# Patient Record
Sex: MALE | Race: WHITE | Employment: FULL TIME | URBAN - METROPOLITAN AREA
[De-identification: names, ages, dates, MRNs, and addresses within clinical notes are randomized per-mention and may not be internally consistent; named-entity substitution may affect disease eponyms.]

---

## 2018-03-05 ENCOUNTER — ED HISTORICAL/CONVERTED ENCOUNTER (OUTPATIENT)
Dept: OTHER | Age: 43
End: 2018-03-05

## 2020-08-28 ENCOUNTER — HOSPITAL ENCOUNTER (EMERGENCY)
Age: 45
Discharge: HOME | End: 2020-08-28
Attending: EMERGENCY MEDICINE
Payer: COMMERCIAL

## 2020-08-28 ENCOUNTER — APPOINTMENT (OUTPATIENT)
Dept: VASCULAR SURGERY | Age: 45
End: 2020-08-28
Attending: NURSE PRACTITIONER
Payer: COMMERCIAL

## 2020-08-28 VITALS
HEIGHT: 72 IN | TEMPERATURE: 98.5 F | HEART RATE: 78 BPM | WEIGHT: 219.8 LBS | OXYGEN SATURATION: 98 % | DIASTOLIC BLOOD PRESSURE: 93 MMHG | BODY MASS INDEX: 29.77 KG/M2 | RESPIRATION RATE: 16 BRPM | SYSTOLIC BLOOD PRESSURE: 142 MMHG

## 2020-08-28 DIAGNOSIS — I82.431 ACUTE DEEP VEIN THROMBOSIS (DVT) OF POPLITEAL VEIN OF RIGHT LOWER EXTREMITY (HCC): Primary | ICD-10-CM

## 2020-08-28 LAB
ANION GAP SERPL CALC-SCNC: 7 MMOL/L (ref 5–15)
BUN SERPL-MCNC: 10 MG/DL (ref 6–20)
BUN/CREAT SERPL: 10 (ref 12–20)
CALCIUM SERPL-MCNC: 8.3 MG/DL (ref 8.5–10.1)
CHLORIDE SERPL-SCNC: 101 MMOL/L (ref 97–108)
CO2 SERPL-SCNC: 26 MMOL/L (ref 21–32)
CREAT SERPL-MCNC: 1.01 MG/DL (ref 0.7–1.3)
GLUCOSE SERPL-MCNC: 106 MG/DL (ref 65–100)
POTASSIUM SERPL-SCNC: 3.6 MMOL/L (ref 3.5–5.1)
SODIUM SERPL-SCNC: 134 MMOL/L (ref 136–145)

## 2020-08-28 PROCEDURE — 74011250637 HC RX REV CODE- 250/637: Performed by: NURSE PRACTITIONER

## 2020-08-28 PROCEDURE — 99282 EMERGENCY DEPT VISIT SF MDM: CPT

## 2020-08-28 PROCEDURE — 93971 EXTREMITY STUDY: CPT

## 2020-08-28 PROCEDURE — 36415 COLL VENOUS BLD VENIPUNCTURE: CPT

## 2020-08-28 PROCEDURE — 80048 BASIC METABOLIC PNL TOTAL CA: CPT

## 2020-08-28 RX ORDER — KETOROLAC TROMETHAMINE 30 MG/ML
30 INJECTION, SOLUTION INTRAMUSCULAR; INTRAVENOUS
Status: DISCONTINUED | OUTPATIENT
Start: 2020-08-28 | End: 2020-08-28

## 2020-08-28 RX ORDER — ACETAMINOPHEN 500 MG
1000 TABLET ORAL
Status: COMPLETED | OUTPATIENT
Start: 2020-08-28 | End: 2020-08-28

## 2020-08-28 RX ORDER — IBUPROFEN 800 MG/1
800 TABLET ORAL
Status: COMPLETED | OUTPATIENT
Start: 2020-08-28 | End: 2020-08-28

## 2020-08-28 RX ORDER — HYDROCODONE BITARTRATE AND ACETAMINOPHEN 5; 325 MG/1; MG/1
1 TABLET ORAL
Qty: 12 TAB | Refills: 0 | Status: SHIPPED | OUTPATIENT
Start: 2020-08-28 | End: 2020-08-31

## 2020-08-28 RX ADMIN — ACETAMINOPHEN 1000 MG: 500 TABLET ORAL at 14:22

## 2020-08-28 RX ADMIN — IBUPROFEN 800 MG: 800 TABLET, FILM COATED ORAL at 14:22

## 2020-08-28 NOTE — ED TRIAGE NOTES
"Per CT read "There are multifocal subsegmental densities within the posterior right upper lobe, lingula, and left lower lobe not definitely seen on prior exam;" additionally, multiple old nodules and micronodules are stable on CT. Pt is a never-smoker, procalcitonin is negative, but does have new wheezing.    Plan  - Abx coverage with levofloxacin. Pt has been afebrile, without leukocytosis, making infectious etiology less likely.  - Single positive Blood culture growing G+ cocci, likely a contaminant. Will repeat Bcx  - Duonebs PRN  - Resp Viral Panel negative  - 40mg Prednisone started for possible reactive airway disease  " Pt arrives with c/o right calf pain x 1 week that has worsened. Also reports right calf swelling.

## 2020-08-28 NOTE — ED PROVIDER NOTES
27-year-old male with past medical history of high blood pressure, and everyday tobacco use. Resents ambulatory to the [facility] emergency department with complaints of right leg pain increasingly worse over the last week, nontraumatic pain. Patient states that the pain started with a small twinge to the right calf, pain has increased with swelling, and it has now affected the gait. She denies history of previous blood clot, however endorses 2-hour commute to and from work, and sitting at work for long extended periods of time. Patient with no other complaints, denies fever, chills, chest pain, shortness of breath, feeling dizzy or lightheaded. No past medical history on file. No past surgical history on file. No family history on file.     Social History     Socioeconomic History    Marital status:      Spouse name: Not on file    Number of children: Not on file    Years of education: Not on file    Highest education level: Not on file   Occupational History    Not on file   Social Needs    Financial resource strain: Not on file    Food insecurity     Worry: Not on file     Inability: Not on file    Transportation needs     Medical: Not on file     Non-medical: Not on file   Tobacco Use    Smoking status: Not on file   Substance and Sexual Activity    Alcohol use: Not on file    Drug use: Not on file    Sexual activity: Not on file   Lifestyle    Physical activity     Days per week: Not on file     Minutes per session: Not on file    Stress: Not on file   Relationships    Social connections     Talks on phone: Not on file     Gets together: Not on file     Attends Judaism service: Not on file     Active member of club or organization: Not on file     Attends meetings of clubs or organizations: Not on file     Relationship status: Not on file    Intimate partner violence     Fear of current or ex partner: Not on file     Emotionally abused: Not on file     Physically abused: Not on file     Forced sexual activity: Not on file   Other Topics Concern    Not on file   Social History Narrative    Not on file         ALLERGIES: Amoxapine    Review of Systems   Constitutional: Negative for chills and fever. Respiratory: Negative for shortness of breath. Cardiovascular: Negative for chest pain. Gastrointestinal: Negative for abdominal pain, nausea and vomiting. Musculoskeletal: Positive for arthralgias, gait problem and myalgias. Pain to right lower leg, nontraumatic and swollen. Skin: Negative for color change and wound. Neurological: Negative for dizziness and light-headedness. Vitals:    08/28/20 1239   BP: (!) 142/93   Pulse: 78   Resp: 16   Temp: 98.5 °F (36.9 °C)   SpO2: 98%   Weight: 99.7 kg (219 lb 12.8 oz)   Height: 6' (1.829 m)            Physical Exam  Vitals signs and nursing note reviewed. Constitutional:       Appearance: Normal appearance. HENT:      Head: Normocephalic. Nose: Nose normal.   Neck:      Musculoskeletal: Normal range of motion. Cardiovascular:      Rate and Rhythm: Normal rate. Pulses: Normal pulses. Dorsalis pedis pulses are 2+ on the right side. Comments: Nonpitting edema to the RLE. Pulmonary:      Effort: Pulmonary effort is normal. No respiratory distress. Breath sounds: No wheezing, rhonchi or rales. Abdominal:      General: There is no distension. Musculoskeletal: Normal range of motion. Right lower leg: He exhibits tenderness and swelling. He exhibits no bony tenderness, no deformity and no laceration. Edema present. Left lower leg: No edema. Legs:    Skin:     General: Skin is dry. Capillary Refill: Capillary refill takes less than 2 seconds. Neurological:      Mental Status: He is alert and oriented to person, place, and time.    Psychiatric:         Mood and Affect: Mood normal.          MDM  Number of Diagnoses or Management Options  Acute deep vein thrombosis (DVT) of popliteal vein of right lower extremity [facility]):   Diagnosis management comments: Possible: DVT  Plan: Duplex study to RLE, analgesia       Amount and/or Complexity of Data Reviewed  Clinical lab tests: ordered and reviewed  Tests in the radiology section of CPT®: ordered and reviewed  Discuss the patient with other providers: yes      VITAL SIGNS:  Patient Vitals for the past 4 hrs:   Temp Pulse Resp BP SpO2   08/28/20 1239 98.5 °F (36.9 °C) 78 16 (!) 142/93 98 %         LABS:  Recent Results (from the past 6 hour(s))   METABOLIC PANEL, BASIC    Collection Time: 08/28/20  2:11 PM   Result Value Ref Range    Sodium 134 (L) 136 - 145 mmol/L    Potassium 3.6 3.5 - 5.1 mmol/L    Chloride 101 97 - 108 mmol/L    CO2 26 21 - 32 mmol/L    Anion gap 7 5 - 15 mmol/L    Glucose 106 (H) 65 - 100 mg/dL    BUN 10 6 - 20 MG/DL    Creatinine 1.01 0.70 - 1.30 MG/DL    BUN/Creatinine ratio 10 (L) 12 - 20      GFR est AA >60 >60 ml/min/1.73m2    GFR est non-AA >60 >60 ml/min/1.73m2    Calcium 8.3 (L) 8.5 - 10.1 MG/DL        IMAGING:  No orders to display         Medications During Visit:  Medications   acetaminophen (TYLENOL) tablet 1,000 mg (1,000 mg Oral Given 8/28/20 1422)   ibuprofen (MOTRIN) tablet 800 mg (800 mg Oral Given 8/28/20 1422)         DECISION MAKING:  Rhiannon Ruiz is a 40 y.o. male who comes in as above. High risk factor for possible DVT, sedentary lifestyle, long car trips at work, everyday tobacco user. Reevaluation: Patient states that the ibuprofen and Tylenol helped mildly but the pain, ultrasound results remarkable for right popliteal vein DVT and right posterior tibial vein DVT. Discussed plan with patient to take apixaban 10 mg twice daily for 7 days and then 5 mg BID for 23 days, and Rx for hydrocodone. Tobacco cessation discussed with patient and also the need to call PCP immediately for follow-up appointment this coming week.   Patient verbalizes understanding agrees with plan patient is hemodynamically stable, renal function checked and found to be within normal limits. IMPRESSION:  1. Acute deep vein thrombosis (DVT) of popliteal vein of right lower extremity (HCC)        DISPOSITION:  Discharged      Current Discharge Medication List      START taking these medications    Details   HYDROcodone-acetaminophen (Norco) 5-325 mg per tablet Take 1 Tab by mouth every four (4) hours as needed for Pain for up to 3 days. Max Daily Amount: 6 Tabs. Qty: 12 Tab, Refills: 0    Associated Diagnoses: Acute deep vein thrombosis (DVT) of popliteal vein of right lower extremity (HCC)      apixaban (ELIQUIS) 5 mg tablet Take 2 Tabs by mouth two (2) times a day for 7 days. Qty: 28 Tab, Refills: 0              Follow-up Information     Follow up With Specialties Details Why Contact Info    Vijaya Reid NP Nurse Practitioner Schedule an appointment as soon as possible for a visit in 2 days Follow-Up in regard to new diagnosis DVT, [address] [phone]      [facility] EMERGENCY DEPT Emergency Medicine  If symptoms worsen [address] [facility]  [phone]            The patient is asked to follow-up with their primary care provider in the next several days. They are to call tomorrow for an appointment. The patient is asked to return promptly for any increased concerns or worsening of symptoms. They can return to this emergency department or any other emergency department. Procedures  Discussed patient care with Dana Oviedo MD. Attending was given the opportunity to see and evaluate the patient. Agrees with care plan as discussed. Elvia Moralez NP  1:54 PM    I was personally available for consultation in the emergency department. I have reviewed the chart and agree with the documentation recorded by the [facility] AND [facility], including the assessment, treatment plan, and disposition.   Felton Whitney MD

## 2020-08-28 NOTE — LETTER
[facility] EMERGENCY DEPT 
[address] [place] [id number] [phone] Work/School Note Date: 8/28/2020 To Whom It May concern: 
 
Oniel Olivares was seen and treated today in the emergency room by the following provider(s): 
Attending Provider: Ludmila Man MD 
Nurse Practitioner: León Retana, Emani Self NP. Oniel Olivares may return to work on 9/3/2020 . Sincerely, Sary Syed NP

## 2020-08-28 NOTE — DISCHARGE INSTRUCTIONS
VERY IMPORTANT FOR YOU TO CALL YOUR PCP ON Monday, SO THAT YOU MAY GET AN ADDITIONAL PRESCRIPTION FOR APIXABAN. TAKE THE COUPON FOR THE APIXABAN TO THE PHARMACY WITH YOU. DO NOT TAKE ANY IBUPROFEN OR ANY OTHER TYPE OF NSAID WHILE ON APIXABAN. IF YOU DEVELOP ANY VISIBLE BLEEDING RETURN TO THE ED IMMEDIATLEY.

## 2021-11-11 ENCOUNTER — OFFICE VISIT (OUTPATIENT)
Dept: ONCOLOGY | Age: 46
End: 2021-11-11
Payer: COMMERCIAL

## 2021-11-11 VITALS
HEART RATE: 64 BPM | SYSTOLIC BLOOD PRESSURE: 160 MMHG | RESPIRATION RATE: 18 BRPM | WEIGHT: 214 LBS | OXYGEN SATURATION: 97 % | HEIGHT: 72 IN | TEMPERATURE: 97 F | BODY MASS INDEX: 28.99 KG/M2 | DIASTOLIC BLOOD PRESSURE: 99 MMHG

## 2021-11-11 DIAGNOSIS — Z86.718 HISTORY OF DVT (DEEP VEIN THROMBOSIS): ICD-10-CM

## 2021-11-11 DIAGNOSIS — D58.2 ELEVATED HEMOGLOBIN (HCC): Primary | ICD-10-CM

## 2021-11-11 DIAGNOSIS — F17.213 CIGARETTE NICOTINE DEPENDENCE WITH WITHDRAWAL: ICD-10-CM

## 2021-11-11 PROCEDURE — 99244 OFF/OP CNSLTJ NEW/EST MOD 40: CPT | Performed by: INTERNAL MEDICINE

## 2021-11-11 NOTE — PROGRESS NOTES
Candy Chopra is a 55 y.o. male    Chief Complaint   Patient presents with   [address] [facility] Patient       Visit Vitals  BP (!) 160/99   Pulse 64   Temp 97 °F (36.1 °C) (Temporal)   Resp 18   Ht 6' (1.829 m)   Wt 214 lb (97.1 kg)   SpO2 97%   BMI 29.02 kg/m²       3 most recent PHQ Screens 11/11/2021   Little interest or pleasure in doing things Not at all   Feeling down, depressed, irritable, or hopeless Not at all   Total Score PHQ 2 0       No flowsheet data found. No flowsheet data found. 1. Have you been to the ER, urgent care clinic since your last visit? Hospitalized since your last visit? No     2. Have you seen or consulted any other health care providers outside of the [address] [place] [address] since your last visit? Include any pap smears or colon screening.  No

## 2021-11-11 NOTE — PROGRESS NOTES
[facility] [place] at [address], [address] [facility]: [phone]  F: [phone] Patient ID  Name: Selina Samson  YOB: 1975  MRN: [id number]  Referring Provider:   No referring provider defined for this encounter. Primary Care Provider:   Bhavana Holly NP       HEMATOLOGY/MEDICAL ONCOLOGY  NOTE   Date of Visit: 11/11/21  Reason for Evaluation:     Chief Complaint   Patient presents with    New Patient     Subjective:     History of Present Illness:     Selina Samson is a 55 y.o. M who presents for an initial evaluation for polycythemia. Patient reports dealing with recovery from surgery on October 20,2021. He reportedly had a left knee injury. He reports some improvement. .   Patient reports adequate oral intake of food and hydration. Patient denies any bowel or bladder problems. Patient denies any pain. Patient denies any shortness of breath. He reports that he was referred to see us for an elevated hemoglobin. He states that he had an elevated white blood cell count in the past and was seen at [facility]. He denies any testosterone use. He denies any unusual headaches. He notes that on occasion (about once a month) has had some visual changes (I.e. staring at the sun then looking awy from it). He did receive the Moderna Vaccine.  He works at [employer].      Past Medical History:   Diagnosis Date    Hypertension       Past Surgical History:   Procedure Laterality Date    HX CHOLECYSTECTOMY  2012    HX HERNIA REPAIR      HX ORTHOPAEDIC Left 10/20/2021    knee surgery    HX SHOULDER ARTHROSCOPY Left 2005      Social History     Tobacco Use    Smoking status: Current Every Day Smoker     Packs/day: 0.50    Smokeless tobacco: Never Used   Substance Use Topics    Alcohol use: Yes     Comment: rarely      Family History   Problem Relation Age of Onset    Leukemia Mother     Heart Disease Father      No current outpatient medications on file. Medications:  Prescription:  Klonapin, xanax, trazadone zyrtec. Atenolol. No current facility-administered medications for this visit. Allergies   Allergen Reactions    Amoxapine Rash      Review of Systems provided by:patient  General: denies fever and denies fatigue  Eyes: denies any acute vision loss and denies any eye pain  HEENT: denies epistaxis and denies trouble swallowing  Cardio: denies any chest pain and reports some mild leg swelling  Resp: denies any shortness of breath and denies any hemoptysis  Abdomen: denies any abdominal pain, denies any nausea, denies any vomiting and denies any diarrhea Patient denies any blood loss. , Patient denies any hematochezia. and Patient denies any melena. MSK: reports arthralgias  Skin: denies any rash and denies any itching  Lymph: denies any lymph node enlargement and denies any lymph node tenderness  Neuro: denies any headache and denies any tremor  Psych: denies depression and reports anxiety      Objective:     Visit Vitals  BP (!) 160/99   Pulse 64   Temp 97 °F (36.1 °C) (Temporal)   Resp 18   Ht 6' (1.829 m)   Wt 214 lb (97.1 kg)   SpO2 97%   BMI 29.02 kg/m²     ECOG PS: 0- Fully active, able to carry on all pre-disease performance without restriction. Physical Exam  Constitutional: No acute distress. , Non-toxic appearance. and Non-diaphoretic. HENT: Normocephalic and atraumatic head. Eyes: Normal Conjunctivae. and Anicteric sclerae. Cardiovascular: Normal heart sounds., No pitting edema., No friction rub. and No gallops auscultated. Pulmonary: Normal Respiratory Effort., No wheezing., No rhonchi. and No rales. Abdominal: Normal bowel sounds. , Soft Abdomen to palpation. , No abdominal tenderness., No guarding. and No rebound tenderness. Skin: No jaundice. and No rash. Musculoskeletal: No muscle pain on palpation. No temporal muscle wasting on inspection.   Neurological: Alert and oriented to person, place, and time. Mental status is at baseline. and No tremor on inspection. Psychiatric: mood normal. normal speech rate and normal affect        Results:     I personally reviewed Epic EHR labs/results below:     Lab Results   Component Value Date/Time    Sodium 134 (L) 08/28/2020 02:11 PM    Potassium 3.6 08/28/2020 02:11 PM    Chloride 101 08/28/2020 02:11 PM    CO2 26 08/28/2020 02:11 PM    Glucose 106 (H) 08/28/2020 02:11 PM    BUN 10 08/28/2020 02:11 PM    Creatinine 1.01 08/28/2020 02:11 PM    GFR est AA >60 08/28/2020 02:11 PM    GFR est non-AA >60 08/28/2020 02:11 PM    Calcium 8.3 (L) 08/28/2020 02:11 PM     No results found for: TBILI, ALT, AP, TP, ALB, GLOB    I personally reviewed pertinent referral notes:   Hgb was 19.0 High on 9/29/21  WBC 9.2    Abs Neutrophils 6.3    Iron Sat 60% Iron 206 elevated. Assessment and Recommendations:   Diagnoses and all orders for this visit:    1. Elevated hemoglobin (HCC)  -     CBC WITH AUTOMATED DIFF; Future  -     PERIPHERAL SMEAR; Future  -     JAK2 V617F, RFX CALR/E12-15/MPL  -     ERYTHROPOIETIN; Future  -     IRON PROFILE; Future  -     FERRITIN; Future  -     XR CHEST PA LAT; Future  -     US ABD COMP; Future  Patient with a history of elevated hemoglobin. We discussed that I would recommend the above work-up. We discussed that I cannot exclude a primary polycythemia vera. We discussed I cannot exclude any hemochromatosis I will repeat his iron studies. May need to consider hemochromatosis mutation testing but will defer until after his myeloproliferative neoplasm testing has been completed. He did have an iron saturation at 60% in September. Patient currently without symptomatic complaints. No plans for phlebotomy at this time. Patient seemingly only agreeable to blood work although I did discuss with him that I cannot exclude an occult liver or renal cancer as a contributor or underlying pulmonary disease.     2. History of DVT (deep vein thrombosis)  Patient with a history of a DVT diagnosed in August 2020. This was acute thrombus present in the right popliteal vein. He states that he completed anticoagulation and is off therapy. Patient reports remaining active. 3. Cigarette nicotine dependence with withdrawal  We discussed that surely smoking can contribute to polycythemia. However, we discussed the importance distillates include and evaluate other causes. Follow-up and Dispositions    · Return in 3 weeks (on 12/2/2021).          Signed By:   Vicenta Jiménez MD

## 2021-11-12 PROBLEM — D58.2 ELEVATED HEMOGLOBIN (HCC): Status: ACTIVE | Noted: 2021-11-12

## 2021-11-23 ENCOUNTER — OFFICE VISIT (OUTPATIENT)
Dept: ORTHOPEDIC SURGERY | Age: 46
End: 2021-11-23
Payer: COMMERCIAL

## 2021-11-23 DIAGNOSIS — M25.562 POSTOPERATIVE PAIN OF LEFT KNEE: Primary | ICD-10-CM

## 2021-11-23 DIAGNOSIS — G89.18 POSTOPERATIVE PAIN OF LEFT KNEE: Primary | ICD-10-CM

## 2021-11-23 PROCEDURE — 99024 POSTOP FOLLOW-UP VISIT: CPT | Performed by: ORTHOPAEDIC SURGERY

## 2021-11-23 RX ORDER — HYDROCODONE BITARTRATE AND ACETAMINOPHEN 10; 325 MG/1; MG/1
1 TABLET ORAL
Qty: 40 TABLET | Refills: 0 | Status: SHIPPED | OUTPATIENT
Start: 2021-11-23 | End: 2021-11-30

## 2021-11-23 NOTE — PROGRESS NOTES
Ramez Mireles (: 1975) is a 55 y.o. male, patient, here for evaluation of the following chief complaint(s):  Knee Pain (Left)       HPI:    He is now 4 weeks status post left quad tendon repair. He states that he has made progress in terms of his ability to ambulate without crutches but does continue to have some pain at night. He continues to use 1 hydrocodone per night to help him sleep. He states overall that he is making good progress and his pain continues to decrease. Allergies   Allergen Reactions    Amoxapine Rash       No current outpatient medications on file. No current facility-administered medications for this visit.        Past Medical History:   Diagnosis Date    Hypertension         Past Surgical History:   Procedure Laterality Date    HX CHOLECYSTECTOMY  2012    HX HERNIA REPAIR      HX ORTHOPAEDIC Left 10/20/2021    knee surgery    HX SHOULDER ARTHROSCOPY Left        Family History   Problem Relation Age of Onset    Leukemia Mother     Heart Disease Father         Social History     Socioeconomic History    Marital status:      Spouse name: Not on file    Number of children: Not on file    Years of education: Not on file    Highest education level: Not on file   Occupational History    Not on file   Tobacco Use    Smoking status: Current Every Day Smoker     Packs/day: 0.50    Smokeless tobacco: Never Used   Substance and Sexual Activity    Alcohol use: Yes     Comment: rarely    Drug use: Yes     Types: Marijuana     Comment: once per month    Sexual activity: Not on file   Other Topics Concern    Not on file   Social History Narrative    Not on file     Social Determinants of Health     Financial Resource Strain:     Difficulty of Paying Living Expenses: Not on file   Food Insecurity:     Worried About Running Out of Food in the Last Year: Not on file    Darlin of Food in the Last Year: Not on file   Transportation Needs:     Lack of Transportation (Medical): Not on file    Lack of Transportation (Non-Medical): Not on file   Physical Activity:     Days of Exercise per Week: Not on file    Minutes of Exercise per Session: Not on file   Stress:     Feeling of Stress : Not on file   Social Connections:     Frequency of Communication with Friends and Family: Not on file    Frequency of Social Gatherings with Friends and Family: Not on file    Attends Restorationist Services: Not on file    Active Member of [address] [place] [address] or Organizations: Not on file    Attends Club or Organization Meetings: Not on file    Marital Status: Not on file   Intimate Partner Violence:     Fear of Current or Ex-Partner: Not on file    Emotionally Abused: Not on file    Physically Abused: Not on file    Sexually Abused: Not on file   Housing Stability:     Unable to Pay for Housing in the Last Year: Not on file    Number of [place] in the Last Year: Not on file    Unstable Housing in the Last Year: Not on file       Review of Systems    Vitals: There were no vitals taken for this visit. There is no height or weight on file to calculate BMI. Ortho Exam     The wounds are clean and dry neuro vas intact. There is no significant effusion. His incisions of healed nicely. His passive heel slide range of motion is up to approximately 40 degrees. His sensations intact his pulses are 2+. ASSESSMENT/PLAN:      1. Postoperative pain of left knee  The following orders have not been finalized:  -     HYDROcodone-acetaminophen (Norco)  mg tablet       Below is the assessment and plan developed based on review of pertinent history, physical exam, labs, studies, and medications. Talked at length about his progress and we will have him begin twice a day gentle heel slides. He will continue to use his knee brace locked in full extension. I will see him back in 2 weeks for reevaluation unlock brace to 50 to 60 degrees.   Will do that for 2 weeks and then unlock him to 90 degrees. We discussed the risk and benefits of this treatment plan and he would like to proceed. No follow-ups on file. An electronic signature was used to authenticate this note.   -- Nathaniel Sawyer

## 2021-11-23 NOTE — LETTER
11/23/2021 12:15 PM    Mr. Wilson Armstrong  [address]  [facility] [address]      Unable to return to work until seen again in two weeks         Sincerely,      Delio Macedo MD

## 2021-11-24 ENCOUNTER — TELEPHONE (OUTPATIENT)
Dept: ONCOLOGY | Age: 46
End: 2021-11-24

## 2021-11-24 LAB
BACKGROUND, 081113: NORMAL
BACKGROUND, 489163: NORMAL
BACKGROUND: 480503: NORMAL
CALR MUTATION DETECTION RESULT, 489451: NORMAL
COMMENT, 489419: NORMAL
EXTRACTION: NORMAL
EXTRACTION: NORMAL
JAK2 GENE MUT ANL BLD/T: NORMAL
JAK2 P.V617F BLD/T QL: NORMAL
LAB DIRECTOR NAME PROVIDER: NORMAL
MPL GENE MUT TESTED BLD/T: NORMAL
MPL P.W515L+W515K+S505N BLD/T QL: NORMAL
REF LAB TEST METHOD: NORMAL
REF LAB TEST METHOD: NORMAL
REFERENCES, 150293: NORMAL
REFERENCES, 150293: NORMAL
REFERENCES: NORMAL
SERVICE CMNT-IMP: NORMAL

## 2021-11-24 NOTE — TELEPHONE ENCOUNTER
[address] at [facility]  [phone]    11/24/21 12:14 PM - Attempted to call patient regarding outstanding ultrasound and x-ray. There was no answer. Left a voicemail for patient to call back at their earliest convenience along with the phone number to our office. Date Of Previous Surgery (Optional): 01.15.2020

## 2021-11-24 NOTE — TELEPHONE ENCOUNTER
Patient called and stated he was returning a call and Ramiro Muse advise me she was calling him to give him the scheduling departments number for an outstanding ultrasound. Patient stated he would like to discuss this at his next visit on 12/2 to see if this still necessary because he did not feel like doing it.  Please advise           ANNE# [phone]

## 2021-12-02 ENCOUNTER — OFFICE VISIT (OUTPATIENT)
Dept: ONCOLOGY | Age: 46
End: 2021-12-02
Payer: COMMERCIAL

## 2021-12-02 VITALS
DIASTOLIC BLOOD PRESSURE: 102 MMHG | RESPIRATION RATE: 16 BRPM | BODY MASS INDEX: 29.28 KG/M2 | WEIGHT: 216.2 LBS | SYSTOLIC BLOOD PRESSURE: 184 MMHG | OXYGEN SATURATION: 98 % | TEMPERATURE: 97 F | HEIGHT: 72 IN | HEART RATE: 66 BPM

## 2021-12-02 DIAGNOSIS — D58.2 ELEVATED HEMOGLOBIN (HCC): Primary | ICD-10-CM

## 2021-12-02 DIAGNOSIS — F17.213 CIGARETTE NICOTINE DEPENDENCE WITH WITHDRAWAL: ICD-10-CM

## 2021-12-02 PROCEDURE — 99213 OFFICE O/P EST LOW 20 MIN: CPT | Performed by: INTERNAL MEDICINE

## 2021-12-02 RX ORDER — ATENOLOL 50 MG/1
TABLET ORAL
COMMUNITY
Start: 2021-11-17

## 2021-12-02 RX ORDER — ALPRAZOLAM 1 MG/1
TABLET ORAL
COMMUNITY
Start: 2021-11-30

## 2021-12-02 RX ORDER — CLONAZEPAM 2 MG/1
TABLET ORAL
COMMUNITY
Start: 2021-11-30

## 2021-12-02 RX ORDER — DICLOFENAC SODIUM 10 MG/G
GEL TOPICAL
COMMUNITY
Start: 2021-07-26

## 2021-12-02 RX ORDER — TRAZODONE HYDROCHLORIDE 100 MG/1
TABLET ORAL
COMMUNITY
Start: 2021-11-29

## 2021-12-02 RX ORDER — PRAVASTATIN SODIUM 20 MG/1
20 TABLET ORAL DAILY
COMMUNITY
Start: 2021-10-05

## 2021-12-02 RX ORDER — OXYCODONE AND ACETAMINOPHEN 5; 325 MG/1; MG/1
TABLET ORAL
COMMUNITY
Start: 2021-10-19

## 2021-12-02 NOTE — LETTER
12/2/2021    Patient: Nelson Thompson   YOB: 1975   Date of Visit: 12/2/2021     Fly Mendez NP  [address]  [address] [facility] [address]  Via Fax: [phone]    Dear Fly Mendez NP,      Thank you for referring Mr. Rachell Lloyd to [facility] for evaluation. My notes for this consultation are attached. If you have questions, please do not hesitate to call me. I look forward to following your patient along with you.       Sincerely,    Corrine Varela MD

## 2021-12-02 NOTE — PROGRESS NOTES
[facility] at [address], [address] [facility] [address] [facility]  Nancy Maganaosh: [phone]  F: [phone] Patient ID  Name: Damian Holland  YOB: 1975  MRN: [id number]  Referring Provider:   No referring provider defined for this encounter. Primary Care Provider:   Chris Moncada NP       HEMATOLOGY/MEDICAL ONCOLOGY  NOTE   Date of Visit: 12/02/21  Reason for Evaluation:     Chief Complaint   Patient presents with    Follow-up     Vickie Benitez is a pleasant 55year old male who presents as a follow up. He reports left knee pain     Subjective:     History of Present Illness:     Damian Holland is a 55 y.o. M who presents for a follow-up evaluation for polycythemia. Patient presents today in follow-up. He had testing completed last month that was negative for any  mutation associated with polycythemia vera. His EPO level was slightly elevated. He tells me that he has reduced his smoking but it does vary. He notes that on a day like today he had about 4 cigarettes. He otherwise states that he feels well and is anticipating some further improvement in his recovery from knee surgery. He denies any headaches or acute vision changes. He denies any plethora. He denies any symptomatic complaints especially with bending over. He denies any shortness of breath.     Past Medical History:   Diagnosis Date    Hypertension       Past Surgical History:   Procedure Laterality Date    HX CHOLECYSTECTOMY  2012    HX HERNIA REPAIR      HX ORTHOPAEDIC Left 10/20/2021    knee surgery    HX SHOULDER ARTHROSCOPY Left 2005      Social History     Tobacco Use    Smoking status: Current Every Day Smoker     Packs/day: 0.50    Smokeless tobacco: Never Used   Substance Use Topics    Alcohol use: Yes     Comment: rarely      Family History   Problem Relation Age of Onset    Leukemia Mother     Heart Disease Father        Current Outpatient Medications   Medication Instructions    ALPRAZolam (XANAX) 1 mg tablet No dose, route, or frequency recorded.  atenoloL (TENORMIN) 50 mg tablet TAKE 1 AND 1/2 TABLETS BY MOUTH EVERY DAY    clonazePAM (KlonoPIN) 2 mg tablet No dose, route, or frequency recorded.  diclofenac (Voltaren) 1 % gel Apply  to affected area.  oxyCODONE-acetaminophen (PERCOCET) 5-325 mg per tablet TAKE 1 TABLET BY MOUTH EVERY 4 HOURS TO 6 HOURS AS NEEDED FOR PAIN    pravastatin (PRAVACHOL) 20 mg, DAILY    traZODone (DESYREL) 100 mg tablet TAKE 1 AND 1/2 TABLETS BY MOUTH AT BEDTIME       No current facility-administered medications for this visit. Allergies   Allergen Reactions    Amoxapine Rash      Review of Systems: A complete review of systems was obtained, reviewed. Pertinent findings reviewed above. Objective:     Visit Vitals  BP (!) 184/102   Pulse 66   Temp 97 °F (36.1 °C)   Resp 16   Ht 6' (1.829 m)   Wt 216 lb 3.2 oz (98.1 kg)   SpO2 98%   BMI 29.32 kg/m²     ECOG PS: 0- Fully active, able to carry on all pre-disease performance without restriction. Physical Exam  Constitutional: No acute distress. , Non-toxic appearance. and Non-diaphoretic. HENT: Normocephalic and atraumatic head. Eyes: Normal Conjunctivae. and Anicteric sclerae. Cardiovascular: Normal heart sounds., No pitting edema., No friction rub. and No gallops auscultated. Pulmonary: Normal Respiratory Effort., No wheezing., No rhonchi. and No rales. Abdominal: Normal bowel sounds. , Soft Abdomen to palpation. , No abdominal tenderness., No guarding. and No rebound tenderness. Skin: No jaundice., No rash., No petechiae. and no angeline complexion. Professional tattoo's. Musculoskeletal: No muscle pain on palpation. No temporal muscle wasting on inspection. Lymph: No cervical, supraclavicular,or axillary lymph node enlargement or tenderness. Neurological: Alert and oriented to person, place, and time. Mental status is at baseline. , No tremor on inspection.    and Normal Gait. Psychiatric: mood normal. normal speech rate and normal affect      Results:     I personally reviewed Epic EHR labs/results below:     Orders Only on 11/11/2021   Component Date Value Ref Range Status    Ferritin 11/11/2021 169  26 - 388 NG/ML Final    Iron 11/11/2021 85  35 - 150 ug/dL Final    TIBC 11/11/2021 408  250 - 450 ug/dL Final    Iron % saturation 11/11/2021 21  20 - 50 % Final    Erythropoietin 11/11/2021 24.2* 2.6 - 18.5 mIU/mL Final    Comment: (NOTE)  Boxxet DxI 800 Immunoassay System  Values obtained with different assay methods or kits cannot be used  interchangeably. Results cannot be interpreted as absolute evidence  of the presence or absence of malignant disease. Performed At: [address] [id number]  Duane Guerrier MD VW:[id number]     [address] [place] [address] 11/11/2021 Pathologic examination results can be viewed in [facility] Chart Review under the Pathology tab.     Final        WBC 11/11/2021 10.0  4.1 - 11.1 K/uL Final    RBC 11/11/2021 5.79* 4.10 - 5.70 M/uL Final    HGB 11/11/2021 20.9* 12.1 - 17.0 g/dL Final    RESULTS VERIFIED, PHONED TO AND READ BACK BY [email or web address]    HCT 11/11/2021 60.3* 36.6 - 50.3 % Final    MCV 11/11/2021 104.1* 80.0 - 99.0 FL Final    MCH 11/11/2021 36.1* 26.0 - 34.0 PG Final    MCHC 11/11/2021 34.7  30.0 - 36.5 g/dL Final    RDW 11/11/2021 12.7  11.5 - 14.5 % Final    PLATELET 07/54/5208 144  150 - 400 K/uL Final    MPV 11/11/2021 12.0  8.9 - 12.9 FL Final    NRBC 11/11/2021 0.0  0  WBC Final    ABSOLUTE NRBC 11/11/2021 0.00  0.00 - 0.01 K/uL Final    NEUTROPHILS 11/11/2021 51  32 - 75 % Final    BAND NEUTROPHILS 11/11/2021 1  0 - 6 % Final    LYMPHOCYTES 11/11/2021 45  12 - 49 % Final    MONOCYTES 11/11/2021 3* 5 - 13 % Final    EOSINOPHILS 11/11/2021 0  0 - 7 % Final    BASOPHILS 11/11/2021 0  0 - 1 % Final    IMMATURE GRANULOCYTES 11/11/2021 0 % Final    ABS. NEUTROPHILS 11/11/2021 5.2  1.8 - 8.0 K/UL Final    ABS. LYMPHOCYTES 11/11/2021 4.5* 0.8 - 3.5 K/UL Final    ABS. MONOCYTES 11/11/2021 0.3  0.0 - 1.0 K/UL Final    ABS. EOSINOPHILS 11/11/2021 0.0  0.0 - 0.4 K/UL Final    ABS. BASOPHILS 11/11/2021 0.0  0.0 - 0.1 K/UL Final    ABS. IMM. GRANS. 11/11/2021 0.0  K/UL Final    DF 11/11/2021 MANUAL    Final    RBC COMMENTS 11/11/2021     Final                    Value:MACROCYTOSIS  1+     Office Visit on 11/11/2021   Component Date Value Ref Range Status    JAK2 Mutation Analysis 11/11/2021 Comment   Final    Comment: Result: NEGATIVE for the JAK2 V617F mutation. Interpretation:  The G to T nucleotide change encoding the V617F  mutation was not detected. This result does not rule out the presence  of the JAK2 mutation at a level below the sensitivity of detection of  this assay, or the presence of other mutations within JAK2 not  detected by this assay. This result does not rule out a diagnosis of  polycythemia vera, essential thrombocythemia or idiopathic  myelofibrosis as the V617F mutation is not detected in all patients  with these disorders.  Background: 11/11/2021 Comment   Final    Comment: JAK2 is a cytoplasmic tyrosine kinase with a key role in signal  transduction from multiple hematopoietic growth factor receptors. A  point mutation within exon 14 of the JAK2 gene ([id number]) encoding a  valine to phenylalanine substitution at position 617 of the JAK2  protein (V617F) has been identified in most patients with polycythemia  vera, and in about half of those with either essential thrombocythemia  or idiopathic myelofibrosis. The V617F has also been detected,  although infrequently, in other myeloid disorders such as chronic  myelomonocytic leukemia and chronic neutrophilic luekemia.  V617F is  an acquired mutation that alters a highly conserved valine present in  the negative regulatory JH2 domain of the JAK2 protein and is  predicted to dysregulate kinase activity. Methodology: Total genomic DNA was extracted and subjected to TaqMan real-time PCR  amplification/detection. Two amplification products per sample were  monitored by real-time PCR using primers/probes specific                            to JAK2 wild  type (WT) and JAK2 mutant V617F. The [employer] Absolute Quantitation  software will compare the patient specimen valuse to the standard  curves and generate percent values for wild type and mutant type. In vitro studies have indicated that this assay has an analytical  sensitivity of 1%. References:  Alexandro NOLASCO, Roland Jewell, et al. Acquired mutation of the  tyrosine kinase JAK2 in human myeloproliferative disorders. Lancet. 2005 Mar 19-25; 365(6573):4926-6121. Ton Davenport JP. A unique clonal JAK2 mutation leading  to constitutive signaling causes polycythaemia vera. Nature. 2005 Apr 28; 711(0013):3327-8319. Margaux R, Kyle F, Jud AS, et al. A gain-of-function  mutation of JAK2 in myeloproliferative disorders. N Engl J Med. 2005  Apr 28; 352(50):9932-8895.  Director Review 11/11/2021 Comment   Final    Comment: Berta Sandoval, PhD, [facility].      Director, [address] [place] [address] for [address] [place] [address] [address] [place] [address]      [phone]  This test was developed and its performance characteristics  determined by [facility]ot Villegas. It has not been cleared or approved  by the Food and Drug Administration.  COMMENT 11/11/2021 Comment   Final    Comment: Reflex to CALR Mutation Analysis, JAK2 Exon 12-15 Mutation Analysis,  and MPL Mutation Analysis is indicated.  Extraction 11/11/2021 Completed   Final    CALR MUTATION DETECTION RESULT 11/11/2021 Comment   Final    Comment: NEGATIVE  No insertions or deletions were detected within the analyzed region  of the calreticulin (CALR) gene.   A negative result does not entirely exclude the possibility of a  clonal population carrying CALR gene mutations that are not covered  by this assay. Results should be interpreted in conjunction with  clinical and laboratory findings for the most accurate interpretation.  Background 11/11/2021 Comment   Final    Comment: The calcium-binding endoplasmic reticulin chaperone protein,  calreticulin (CALR), is somatically mutated in approximately 70% of  patients with JAK2-negative essential thrombocythemia (ET) and 60-88%  of patients with JAK2-negative primary myelofibrosis(PMF). Only a  minority of patients (approximately 8%) with myelodysplasia have  mutations in  CALR gene. CALR mutations are rarely detected in  patients with de mayito acute myeloid leukemia, chronic myelogenous  leukemia, lymphoid leukemia, or solid tumors. CALR mutations are not  detected in polycythemia and generally appear to be mutually exclusive  with JAK2 mutations and MPL mutations. The majority of mutational changes involve a variety of insertion or  deletion mutations in exon 9 of the calreticulin gene: approximately  53% of all CALR mutations are a 52 bp deletion (type-1) while the  second most prevalent mutation (approximately 32%) contains a 5 bp  insertion (type-2). Other mutations (non-type 1 or type 2) are seen  in a sma                           ll minority of cases. CALR mutations in PMF tend to be  associated with a favorable prognosis compared to JAK2 V617F  mutations, whereas primary myelofibrosis negative for CALR, JAK2  V617F and MPL mutations (so-called triple negative) is associated  with a poor prognosis and shorter survival.  The detection of a CALR gene mutation aids in the specific diagnosis  of a myeloproliferative neoplasm, and help distinguish this clonal  disease from a benign reactive process.  Methodology 11/11/2021 Comment   Final    Comment: Genomic DNA was isolated from the provided specimen.  Polymerase chain  reaction (PCR) of exon 9 of the CALR gene was performed with specific  fluorescent-labeled primers, and the PCR product was analyzed by  capillary gel electrophoresis to determine the size of the PCR  products. This PCR assay is capable of detecting a mutant cell  population with a sensitivity of 5 mutant cells per 100 normal cells. A negative result does not exclude the presence of a  myeloproliferative disorder or other neoplastic process. This test was developed and its performance characteristics determined  by [employer]. It has not been cleared or approved by the Food and Drug  Administration. The FDA has determined that such clearance or approval  is not necessary.  References 11/11/2021 Comment   Final    Comment: 1. Lizeth Howell et al. (2013) Somatic mutations of calreticulin in     myeloproliferative neoplasms. New Engl. J. Med. 124:0315-2230.  2. Lowell Cushing et al. (2013) Somatic CALR mutations in     myeloproliferative neoplasms with nonmutated JAK2. [facility] [place]. [facility] 537:4617-0516.  Director review 11/11/2021 Comment   Final    Comment: Otoniel Berrios, PhD, [facility] [place]                 Director, [address] [place] [address]                 [phone]      JAK2 Exons 12-15 Mut Det PCR 11/11/2021 Comment   Final    Comment: NEGATIVE  JAK2 mutations were not detected in exons 12, 13, 14 and 15. This  result does not rule out the presence of JAK2 mutation at a level  below the detection sensitivity of this assay, the presence of  other mutations outside the analyzed region of the JAK2 gene, or  the presence of a myeloproliferative or other neoplasm. Result must  be correlated with other clinical data for the most accurate  diagnosis.  Background 11/11/2021 Comment   Final    Comment: JAK2 V617F mutation is detected in patients with polycythemia vera  (95%), essential thrombocythemia (50%) and primary myelofibrosis  (50%).  A small percentage of JAK2 mutation positive patients (3.3%)  contain other non-V617F mutations within exons 12 to 15. The detection  of a JAK2 gene mutation aids in the specific diagnosis of a  myeloproliferative neoplasm, and help distinguish this clonal disease  from a benign reactive process.  Method 11/11/2021 Comment   Final    Comment: Total RNA was purified from the provided specimen. The JAK2 gene  region covering exons 12 to 15 was subjected to reverse-transcription  coupled PCR amplification, and bi-directional sequencing to identify  sequence variations. This assay has a sensitivity to detect  approximately 15% population of cells containing the JAK2 mutations  in a background of non-mutant cells. This test was developed and its performance characteristics determined  by [employer]. It has not been cleared or approved by the Food and Drug  Administration.  References 11/11/2021 Comment   Final    Comment: Zuleyma Lugo et al. Detection of mutations in JAK2 exons 12-15 by  Frandy sequencing. Int J Lab Hemato. 2015, 38:34-41. Michelle Luna al. Mutation profile of JAK2 transcripts in patients with  chronic myeloproliferative neoplasias. J Mol Diagn. 2009, 11:49-53.  Director review 11/11/2021 Comment   Final    Comment: Fred Moctezuma, PhD, [address] [facility] Associate , [address] [place] [address] for Molecular Biology / Pathology     Laboratory [facility] of [address] [place], [place] [address] [facility], [address] [place] [address], [address] [place] [address]     [phone]      MPL MUTATION ANALYSIS RESULT 11/11/2021 Comment   Final    Comment: No MPL mutation was identified in the provided specimen of this  individual. Results should be interpreted in conjunction with  clinical and other laboratory findings for the most accurate  interpretation.       Background: 11/11/2021 Comment   Final    Comment: MPL (myeloproliferative leukemia virus oncogene homology) belongs to  the hematopoietin superfamily and enables its ligand thrombopoietin  to facilitate both global hematopoiesis and megakaryocyte growth and  differentiation. MPL W515 mutations are present in patients with  primary myelofibrosis (PMF) and essential thrombocythemia (ET) at a  frequency of approximately 5% and 1% respectively. The S505 mutation  is detected in patients with hereditary thrombocythemia.  Methodology 11/11/2021 Comment   Final    Comment: Genomic DNA was purified from the provided specimen. MPL gene region  covering the S505N and W515L/K mutations were subjected to PCR  amplification and bi-directional sequencing in duplicate to identify  sequence variations. This assay has a sensitivity to detect  approximately 20-25% population of cells containing the MPL mutations  in a background of non-mutant cells. This assay will not detect the  mutation below the sensitivity of this assay. Molecular-based testing  is highly accurate, but as in any laboratory test, rare diagnostic  errors may occur.  References 11/11/2021 Comment   Final    Comment: 1. Miroslava ESPINOZA et al. (2006). GOU688 mutations in     myeloproliferative and other myeloid disorders: a study     of 1182 patients. Blood 254:1633-9018.  2. Franky Gama and Cherelle JENKINS. (2008). JAK2 and MPL     mutations in myeloproliferative neoplasms: discovery and     science. Leukemia 22:1736-3192.  3. Christy Latif et al. (2009). Evidence for a  effect     of the MPL-S505N mutation in eight [facility] pedigrees with     hereditary thrombocythemia. Haematologica 94(10):7068-     8943.       Director review 11/11/2021 Comment   Final    Comment: Marcia Escudero, PhD, [address] [facility] , [address] [place] [address] for Molecular Biology / Pathology     [address] [place] [address], [address] [place] [address]     [phone]  This test was developed and its performance characteristics  determined by [facility]. It has not been cleared or approved  by the Food and Drug Administration.  Extraction 11/11/2021 Comment   Final    This sample has been received and DNA extraction has been performed. Assessment and Recommendations:       Diagnoses and all orders for this visit:    1. Elevated hemoglobin (HCC)  -     CBC W/O DIFF; Future  Recheck his CBC; While likely secondary polycythemia due to either smoking +/- occult tumor, +/- undiagnosed sleep apnea. Since asymptomatic no plans for phlebotomy; reviewed alarm symptoms for patient to seek care. Not able to easily check a carboxyhemoglobin level. He understood my recommendation for him to undergo evaluation with abdominal ultrasound and chest xray since he could have an occult tumor and we don't want to miss this because resection or other therapies could be curative. 2. Cigarette nicotine dependence with withdrawal  Strongly recommend smoking cessation. He said that he has reduced his smoking. We discussed that if his polycythemia improves then that may support the cuase of smoking at least in part though we already suspect this explanation at least in part. Follow-up and Dispositions    · Return in about 8 weeks (around 1/27/2022). I spent a total of 25 minutes on this encounter with the more than 50% counseling patient to stop smoking, and reconsider going for imaging to evaluate for any occult EPO-secreting tumors, as well as consideration to be evaluated for sleep apnea in primary care since these factors collectively all can contribute to a polycythemia.     Signed By:   Brandy Esparza MD

## 2021-12-02 NOTE — PROGRESS NOTES
Chief Complaint   Patient presents with    Follow-up     Darling Sinha is a pleasant 55year old male who presents as a follow up.  He reports left knee pain

## 2021-12-02 NOTE — PATIENT INSTRUCTIONS
Orders Only on 11/11/2021   Component Date Value Ref Range Status    Ferritin 11/11/2021 169  26 - 388 NG/ML Final    Iron 11/11/2021 85  35 - 150 ug/dL Final    TIBC 11/11/2021 408  250 - 450 ug/dL Final    Iron % saturation 11/11/2021 21  20 - 50 % Final    Erythropoietin 11/11/2021 24.2* 2.6 - 18.5 mIU/mL Final    Comment: (NOTE)  "[employer] DxI 800 Immunoassay System  Values obtained with different assay methods or kits cannot be used  interchangeably. Results cannot be interpreted as absolute evidence  of the presence or absence of malignant disease. Performed At: [address] [id number]  Smiley Shepherd MD QC:[id number]     [address] [place] [address] 11/11/2021 Pathologic examination results can be viewed in [facility] Chart Review under the Pathology tab. Final        WBC 11/11/2021 10.0  4.1 - 11.1 K/uL Final    RBC 11/11/2021 5.79* 4.10 - 5.70 M/uL Final    HGB 11/11/2021 20.9* 12.1 - 17.0 g/dL Final    RESULTS VERIFIED, PHONED TO AND READ BACK BY [email or web address]    HCT 11/11/2021 60.3* 36.6 - 50.3 % Final    MCV 11/11/2021 104.1* 80.0 - 99.0 FL Final    MCH 11/11/2021 36.1* 26.0 - 34.0 PG Final    MCHC 11/11/2021 34.7  30.0 - 36.5 g/dL Final    RDW 11/11/2021 12.7  11.5 - 14.5 % Final    PLATELET 16/83/5317 355  150 - 400 K/uL Final    MPV 11/11/2021 12.0  8.9 - 12.9 FL Final    NRBC 11/11/2021 0.0  0  WBC Final    ABSOLUTE NRBC 11/11/2021 0.00  0.00 - 0.01 K/uL Final    NEUTROPHILS 11/11/2021 51  32 - 75 % Final    BAND NEUTROPHILS 11/11/2021 1  0 - 6 % Final    LYMPHOCYTES 11/11/2021 45  12 - 49 % Final    MONOCYTES 11/11/2021 3* 5 - 13 % Final    EOSINOPHILS 11/11/2021 0  0 - 7 % Final    BASOPHILS 11/11/2021 0  0 - 1 % Final    IMMATURE GRANULOCYTES 11/11/2021 0  % Final    ABS. NEUTROPHILS 11/11/2021 5.2  1.8 - 8.0 K/UL Final    ABS. LYMPHOCYTES 11/11/2021 4.5* 0.8 - 3.5 K/UL Final    ABS.  MONOCYTES 11/11/2021 0.3  0.0 - 1.0 K/UL Final    ABS. EOSINOPHILS 11/11/2021 0.0  0.0 - 0.4 K/UL Final    ABS. BASOPHILS 11/11/2021 0.0  0.0 - 0.1 K/UL Final    ABS. IMM. GRANS. 11/11/2021 0.0  K/UL Final    DF 11/11/2021 MANUAL    Final    RBC COMMENTS 11/11/2021     Final                    Value:MACROCYTOSIS  1+     Office Visit on 11/11/2021   Component Date Value Ref Range Status    JAK2 Mutation Analysis 11/11/2021 Comment   Final    Comment: Result: NEGATIVE for the JAK2 V617F mutation. Interpretation:  The G to T nucleotide change encoding the V617F  mutation was not detected. This result does not rule out the presence  of the JAK2 mutation at a level below the sensitivity of detection of  this assay, or the presence of other mutations within JAK2 not  detected by this assay. This result does not rule out a diagnosis of  polycythemia vera, essential thrombocythemia or idiopathic  myelofibrosis as the V617F mutation is not detected in all patients  with these disorders.  Background: 11/11/2021 Comment   Final    Comment: JAK2 is a cytoplasmic tyrosine kinase with a key role in signal  transduction from multiple hematopoietic growth factor receptors. A  point mutation within exon 14 of the JAK2 gene ([id number]) encoding a  valine to phenylalanine substitution at position 617 of the JAK2  protein (V617F) has been identified in most patients with polycythemia  vera, and in about half of those with either essential thrombocythemia  or idiopathic myelofibrosis. The V617F has also been detected,  although infrequently, in other myeloid disorders such as chronic  myelomonocytic leukemia and chronic neutrophilic luekemia. V617F is  an acquired mutation that alters a highly conserved valine present in  the negative regulatory JH2 domain of the JAK2 protein and is  predicted to dysregulate kinase activity. Methodology: Total genomic DNA was extracted and subjected to TaqMan real-time PCR  amplification/detection. Two amplification products per sample were  monitored by real-time PCR using primers/probes specific                            to JAK2 wild  type (WT) and JAK2 mutant V617F. The [id number] Absolute Quantitation  software will compare the patient specimen valuse to the standard  curves and generate percent values for wild type and mutant type. In vitro studies have indicated that this assay has an analytical  sensitivity of 1%. References:  Alexandro NOLASCO, Roland Smith, et al. Acquired mutation of the  tyrosine kinase JAK2 in human myeloproliferative disorders. Lancet. 2005 Mar 19-25; 365(8926):3711-2984. Mario Davenport JP. A unique clonal JAK2 mutation leading  to constitutive signaling causes polycythaemia vera. Nature. 2005 Apr 28; 036(2085):0860-2273. Margaux R, Kyle F, Jud AS, et al. A gain-of-function  mutation of JAK2 in myeloproliferative disorders. N Engl J Med. 2005  Apr 28; 352(69):2372-6115.  Director Review 11/11/2021 Comment   Final    Comment: Leopoldo Halter, PhD, [facility].      Director, [address] [place] [address], [address] [place] [address]      [phone]  This test was developed and its performance characteristics  determined by Ruth Recio. It has not been cleared or approved  by the Food and Drug Administration.  COMMENT 11/11/2021 Comment   Final    Comment: Reflex to CALR Mutation Analysis, JAK2 Exon 12-15 Mutation Analysis,  and MPL Mutation Analysis is indicated.  Extraction 11/11/2021 Completed   Final    CALR MUTATION DETECTION RESULT 11/11/2021 Comment   Final    Comment: NEGATIVE  No insertions or deletions were detected within the analyzed region  of the calreticulin (CALR) gene. A negative result does not entirely exclude the possibility of a  clonal population carrying CALR gene mutations that are not covered  by this assay.  Results should be interpreted in conjunction with  clinical and laboratory findings for the most accurate interpretation.  Background 11/11/2021 Comment   Final    Comment: The calcium-binding endoplasmic reticulin chaperone protein,  calreticulin (CALR), is somatically mutated in approximately 70% of  patients with JAK2-negative essential thrombocythemia (ET) and 60-88%  of patients with JAK2-negative primary myelofibrosis(PMF). Only a  minority of patients (approximately 8%) with myelodysplasia have  mutations in  CALR gene. CALR mutations are rarely detected in  patients with de mayito acute myeloid leukemia, chronic myelogenous  leukemia, lymphoid leukemia, or solid tumors. CALR mutations are not  detected in polycythemia and generally appear to be mutually exclusive  with JAK2 mutations and MPL mutations. The majority of mutational changes involve a variety of insertion or  deletion mutations in exon 9 of the calreticulin gene: approximately  53% of all CALR mutations are a 52 bp deletion (type-1) while the  second most prevalent mutation (approximately 32%) contains a 5 bp  insertion (type-2). Other mutations (non-type 1 or type 2) are seen  in a sma                           ll minority of cases. CALR mutations in PMF tend to be  associated with a favorable prognosis compared to JAK2 V617F  mutations, whereas primary myelofibrosis negative for CALR, JAK2  V617F and MPL mutations (so-called triple negative) is associated  with a poor prognosis and shorter survival.  The detection of a CALR gene mutation aids in the specific diagnosis  of a myeloproliferative neoplasm, and help distinguish this clonal  disease from a benign reactive process.  Methodology 11/11/2021 Comment   Final    Comment: Genomic DNA was isolated from the provided specimen.  Polymerase chain  reaction (PCR) of exon 9 of the CALR gene was performed with specific  fluorescent-labeled primers, and the PCR product was analyzed by  capillary gel electrophoresis to determine the size of the PCR  products. This PCR assay is capable of detecting a mutant cell  population with a sensitivity of 5 mutant cells per 100 normal cells. A negative result does not exclude the presence of a  myeloproliferative disorder or other neoplastic process. This test was developed and its performance characteristics determined  by [facility]. It has not been cleared or approved by the Food and Drug  Administration. The FDA has determined that such clearance or approval  is not necessary.  References 11/11/2021 Comment   Final    Comment: 1. Rajat Neri et al. (2013) Somatic mutations of calreticulin in     myeloproliferative neoplasms. [place] Engl. J. Med. 305:0167-2336.  2. Roxann Oliveira et al. (2013) Somatic CALR mutations in     myeloproliferative neoplasms with nonmutated JAK2. [place] [facility] 650:3748-5747.  Director review 11/11/2021 Comment   Final    Comment: Darshana Henry, PhD, [facility], [facility].                 Director, [address] [place] [address]                 [phone]      JAK2 Exons 12-15 Mut Det PCR 11/11/2021 Comment   Final    Comment: NEGATIVE  JAK2 mutations were not detected in exons 12, 13, 14 and 15. This  result does not rule out the presence of JAK2 mutation at a level  below the detection sensitivity of this assay, the presence of  other mutations outside the analyzed region of the JAK2 gene, or  the presence of a myeloproliferative or other neoplasm. Result must  be correlated with other clinical data for the most accurate  diagnosis.  Background 11/11/2021 Comment   Final    Comment: JAK2 V617F mutation is detected in patients with polycythemia vera  (95%), essential thrombocythemia (50%) and primary myelofibrosis  (50%). A small percentage of JAK2 mutation positive patients (3.3%)  contain other non-V617F mutations within exons 12 to 15.  The detection  of a JAK2 gene mutation aids in the specific diagnosis of a  myeloproliferative neoplasm, and help distinguish this clonal disease  from a benign reactive process.  Method 11/11/2021 Comment   Final    Comment: Total RNA was purified from the provided specimen. The JAK2 gene  region covering exons 12 to 15 was subjected to reverse-transcription  coupled PCR amplification, and bi-directional sequencing to identify  sequence variations. This assay has a sensitivity to detect  approximately 15% population of cells containing the JAK2 mutations  in a background of non-mutant cells. This test was developed and its performance characteristics determined  by [facility]. It has not been cleared or approved by the Food and Drug  Administration.  References 11/11/2021 Comment   Final    Comment: Ward Gonzalez et al. Detection of mutations in JAK2 exons 12-15 by  [place] sequencing. Int J Lab Hemato. 2015, 38:34-41. Ankit Jenkins al. Mutation profile of JAK2 transcripts in patients with  chronic myeloproliferative neoplasias. J Mol Diagn. 2009, 11:49-53.  Director review 11/11/2021 Comment   Final    Comment: Eliu Castorena, PhD, [address] [facility] Associate , [address] [place] [address] for Molecular Biology / Pathology     Laboratory [facility] of [address] [place], [place] [id number] [phone]      MPL MUTATION ANALYSIS RESULT 11/11/2021 Comment   Final    Comment: No MPL mutation was identified in the provided specimen of this  individual. Results should be interpreted in conjunction with  clinical and other laboratory findings for the most accurate  interpretation.       Background: 11/11/2021 Comment   Final    Comment: MPL (myeloproliferative leukemia virus oncogene homology) belongs to  the hematopoietin superfamily and enables its ligand thrombopoietin  to facilitate both global hematopoiesis and megakaryocyte growth and  differentiation. MPL W515 mutations are present in patients with  primary myelofibrosis (PMF) and essential thrombocythemia (ET) at a  frequency of approximately 5% and 1% respectively. The S505 mutation  is detected in patients with hereditary thrombocythemia.  Methodology 11/11/2021 Comment   Final    Comment: Genomic DNA was purified from the provided specimen. MPL gene region  covering the S505N and W515L/K mutations were subjected to PCR  amplification and bi-directional sequencing in duplicate to identify  sequence variations. This assay has a sensitivity to detect  approximately 20-25% population of cells containing the MPL mutations  in a background of non-mutant cells. This assay will not detect the  mutation below the sensitivity of this assay. Molecular-based testing  is highly accurate, but as in any laboratory test, rare diagnostic  errors may occur.  References 11/11/2021 Comment   Final    Comment: 1. Miroslava ESPINOZA, et al. (2006). SUC130 mutations in     myeloproliferative and other myeloid disorders: a study     of 1182 patients. Blood 495:3891-3649.  2. Mile Reagan and Cherelle JENKINS. (2008). JAK2 and MPL     mutations in myeloproliferative neoplasms: discovery and     science. Leukemia 22:6756-5582.  3. Delmis Cash et al. (2009). Evidence for a  effect     of the MPL-S505N mutation in eight [facility] pedigrees with     hereditary thrombocythemia. Haematologica 94(10):9373-     5164.       Director review 11/11/2021 Comment   Final    Comment: Makayla Pozo, PhD, [address] [facility] Associate , [address] [place] [address] for Molecular Biology / Pathology     [address] [place] [address], [address] [place] [address]     [phone]  This test was developed and its performance characteristics  determined by Ella [facility]ad[facility]. It has not been cleared or approved  by the Food and Drug Administration.  Extraction 11/11/2021 Comment   Final    This sample has been received and DNA extraction has been performed.

## 2021-12-07 ENCOUNTER — OFFICE VISIT (OUTPATIENT)
Dept: ORTHOPEDIC SURGERY | Age: 46
End: 2021-12-07
Payer: COMMERCIAL

## 2021-12-07 DIAGNOSIS — Z98.890 STATUS POST LEFT KNEE SURGERY: ICD-10-CM

## 2021-12-07 DIAGNOSIS — M25.562 POSTOPERATIVE PAIN OF LEFT KNEE: Primary | ICD-10-CM

## 2021-12-07 DIAGNOSIS — G89.18 POSTOPERATIVE PAIN OF LEFT KNEE: Primary | ICD-10-CM

## 2021-12-07 PROCEDURE — 99024 POSTOP FOLLOW-UP VISIT: CPT | Performed by: ORTHOPAEDIC SURGERY

## 2021-12-07 NOTE — PROGRESS NOTES
Adonay Perdomo (: 1975) is a 55 y.o. male, patient, here for evaluation of the following chief complaint(s):  Leg Pain (left) and Surgical Follow-up       HPI:    He is now approaching 7 weeks status post left knee open quadriceps tendon repair. The surgery was performed on 10/21/2021. He was last seen on 2021. The patient states that his pain level is slightly improved since his last visit. He rates the severity of his postoperative left knee pain is a 5 out of 10. He describes his pain as throbbing, aching, and intermittent. His postoperative left knee pain does still make it difficult for him to go to sleep and does wake him up from sleep. He has been experiencing some swelling and weakness in his left leg and knee. He has been taking narcotic pain medication for his discomfort as needed. He has been wearing his postoperative knee brace as instructed. He has been working on heel slides with an at-home exercise program. He has not been to formal physical therapy at this point. Allergies   Allergen Reactions    Amoxapine Rash       Current Outpatient Medications   Medication Sig    ALPRAZolam (XANAX) 1 mg tablet     atenoloL (TENORMIN) 50 mg tablet TAKE 1 AND 1/2 TABLETS BY MOUTH EVERY DAY    clonazePAM (KlonoPIN) 2 mg tablet     traZODone (DESYREL) 100 mg tablet TAKE 1 AND 1/2 TABLETS BY MOUTH AT BEDTIME    pravastatin (PRAVACHOL) 20 mg tablet Take 20 mg by mouth daily. (Patient not taking: Reported on 2021)    diclofenac (Voltaren) 1 % gel Apply  to affected area. (Patient not taking: Reported on 2021)    oxyCODONE-acetaminophen (PERCOCET) 5-325 mg per tablet TAKE 1 TABLET BY MOUTH EVERY 4 HOURS TO 6 HOURS AS NEEDED FOR PAIN     No current facility-administered medications for this visit.        Past Medical History:   Diagnosis Date    Hypertension         Past Surgical History:   Procedure Laterality Date    HX CHOLECYSTECTOMY      HX HERNIA REPAIR      HX ORTHOPAEDIC Left 10/20/2021    knee surgery    HX SHOULDER ARTHROSCOPY Left 2005       Family History   Problem Relation Age of Onset    Leukemia Mother     Heart Disease Father         Social History     Socioeconomic History    Marital status:      Spouse name: Not on file    Number of children: Not on file    Years of education: Not on file    Highest education level: Not on file   Occupational History    Not on file   Tobacco Use    Smoking status: Current Every Day Smoker     Packs/day: 0.50    Smokeless tobacco: Never Used   Substance and Sexual Activity    Alcohol use: Yes     Comment: rarely    Drug use: Yes     Types: Marijuana     Comment: once per month    Sexual activity: Not on file   Other Topics Concern    Not on file   Social History Narrative    Not on file     Social Determinants of Health     Financial Resource Strain:     Difficulty of Paying Living Expenses: Not on file   Food Insecurity:     Worried About Running Out of Food in the Last Year: Not on file    Darlin of Food in the Last Year: Not on file   Transportation Needs:     Lack of Transportation (Medical): Not on file    Lack of Transportation (Non-Medical):  Not on file   Physical Activity:     Days of Exercise per Week: Not on file    Minutes of Exercise per Session: Not on file   Stress:     Feeling of Stress : Not on file   Social Connections:     Frequency of Communication with Friends and Family: Not on file    Frequency of Social Gatherings with Friends and Family: Not on file    Attends Taoist Services: Not on file    Active Member of Clubs or Organizations: Not on file    Attends Club or Organization Meetings: Not on file    Marital Status: Not on file   Intimate Partner Violence:     Fear of Current or Ex-Partner: Not on file    Emotionally Abused: Not on file    Physically Abused: Not on file    Sexually Abused: Not on file   Housing Stability:     Unable to Pay for Housing in the Last Year: Not on file    Number of Places Lived in the Last Year: Not on file    Unstable Housing in the Last Year: Not on file       Review of Systems   All other systems reviewed and are negative. Vitals:  Ht 6' (1.829 m)   Wt 220 lb (99.8 kg)   BMI 29.84 kg/m²    Body mass index is 29.84 kg/m². Ortho Exam     General: Well-dressed well-nourished male no acute distress, alert oriented x3    Left knee: Incision well-healed over the anterior portion of the distal thigh and knee. No erythema surrounding the incision. Range of motion from 2-50 degrees without crepitus. He lacks a couple degrees of full extension. Calf is soft and supple without swelling or tenderness. Sensation is intact to light touch over the leg and distally in the foot. ASSESSMENT/PLAN:      1. Postoperative pain of left knee  2. Status post left knee surgery  -     REFERRAL TO PHYSICAL THERAPY      Below is the assessment and plan developed based on review of pertinent history, physical exam, labs, studies, and medications. **The patient was referred to formal physical therapy. **    We discussed the patient's left open quadriceps tendon repair performed on 10/21/2021. He is now approaching 7 weeks status post surgical intervention. The patient continues to progress nicely in his recovery. He is having the appropriate amount of expected postoperative discomfort at this time. His seated nonweightbearing range of motion and strength are improving. The patient will continue the postoperative protocol by remaining in his postoperative knee brace as instructed. We did unlock his knee brace today. The patient will continue to work on range of motion, strengthening, and stretching exercises at both formal physical therapy and with an at-home exercise program as pain tolerates. He will go to formal therapy twice a week.  He is still to avoid any deep knee bend activities against resistance, squatting, kneeling, stairs, lunging, and high impact loading activities. I did encourage him to ice and elevate when possible, modify his activity level based on his postoperative left knee pain, and use anti-inflammatory medication when necessary. He will remain out of work until seen again. I will see him back in 3 to 4 weeks for reevaluation and further discussion of the postoperative protocol and his work status. Return in about 3 weeks (around 12/28/2021).

## 2021-12-07 NOTE — LETTER
12/7/2021 4:06 PM    Mr. Nikita Pedersen  [address] [place] [address]      We will keep him out of work for another 4 weeks and re-assess at his next appointment.          Sincerely,      Nancy Nava MD

## 2021-12-08 VITALS — BODY MASS INDEX: 29.8 KG/M2 | HEIGHT: 72 IN | WEIGHT: 220 LBS

## 2021-12-28 ENCOUNTER — OFFICE VISIT (OUTPATIENT)
Dept: ORTHOPEDIC SURGERY | Age: 46
End: 2021-12-28
Payer: COMMERCIAL

## 2021-12-28 DIAGNOSIS — Z98.890 STATUS POST LEFT KNEE SURGERY: ICD-10-CM

## 2021-12-28 DIAGNOSIS — M22.42 PATELLA, CHONDROMALACIA, LEFT: ICD-10-CM

## 2021-12-28 DIAGNOSIS — G89.18 POSTOPERATIVE PAIN OF LEFT KNEE: Primary | ICD-10-CM

## 2021-12-28 DIAGNOSIS — M24.562 CONTRACTURE OF LEFT KNEE: ICD-10-CM

## 2021-12-28 DIAGNOSIS — M25.562 POSTOPERATIVE PAIN OF LEFT KNEE: Primary | ICD-10-CM

## 2021-12-28 PROCEDURE — 99024 POSTOP FOLLOW-UP VISIT: CPT | Performed by: ORTHOPAEDIC SURGERY

## 2021-12-28 NOTE — PROGRESS NOTES
Candy Chopra (: 1975) is a 55 y.o. male, patient, here for evaluation of the following chief complaint(s):  Surgical Follow-up and Knee Pain (left)       HPI:    He is now almost 10 weeks status post left knee open quadriceps tendon repair. The surgery was performed on 10/21/2021. He was last seen on 2021. The patient states that his pain has improved since his last visit and surgical procedure. He rates the severity of his postoperative left knee pain is a 3 out of 10. He describes his pain is dull, aching, and intermittent. His postoperative discomfort does still make it difficult for him to go to sleep and does wake him up from sleep. He has been experiencing some postoperative swelling. The patient has been taking medication for his discomfort as needed. He has been wearing his postoperative knee brace as instructed. The patient has been attending formal physical therapy postoperatively. Allergies   Allergen Reactions    Amoxapine Rash       Current Outpatient Medications   Medication Sig    ALPRAZolam (XANAX) 1 mg tablet     atenoloL (TENORMIN) 50 mg tablet TAKE 1 AND 1/2 TABLETS BY MOUTH EVERY DAY    clonazePAM (KlonoPIN) 2 mg tablet     traZODone (DESYREL) 100 mg tablet TAKE 1 AND 1/2 TABLETS BY MOUTH AT BEDTIME    pravastatin (PRAVACHOL) 20 mg tablet Take 20 mg by mouth daily. (Patient not taking: Reported on 2021)    diclofenac (Voltaren) 1 % gel Apply  to affected area. (Patient not taking: Reported on 2021)    oxyCODONE-acetaminophen (PERCOCET) 5-325 mg per tablet TAKE 1 TABLET BY MOUTH EVERY 4 HOURS TO 6 HOURS AS NEEDED FOR PAIN     No current facility-administered medications for this visit.        Past Medical History:   Diagnosis Date    Hypertension         Past Surgical History:   Procedure Laterality Date    HX CHOLECYSTECTOMY      HX HERNIA REPAIR      HX ORTHOPAEDIC Left 10/20/2021    knee surgery    HX SHOULDER ARTHROSCOPY Left  Family History   Problem Relation Age of Onset    Leukemia Mother     Heart Disease Father         Social History     Socioeconomic History    Marital status:      Spouse name: Not on file    Number of children: Not on file    Years of education: Not on file    Highest education level: Not on file   Occupational History    Not on file   Tobacco Use    Smoking status: Current Every Day Smoker     Packs/day: 0.50    Smokeless tobacco: Never Used   Substance and Sexual Activity    Alcohol use: Yes     Comment: rarely    Drug use: Yes     Types: Marijuana     Comment: once per month    Sexual activity: Not on file   Other Topics Concern    Not on file   Social History Narrative    Not on file     Social Determinants of Health     Financial Resource Strain:     Difficulty of Paying Living Expenses: Not on file   Food Insecurity:     Worried About Running Out of Food in the Last Year: Not on file    Darlin of Food in the Last Year: Not on file   Transportation Needs:     Lack of Transportation (Medical): Not on file    Lack of Transportation (Non-Medical):  Not on file   Physical Activity:     Days of Exercise per Week: Not on file    Minutes of Exercise per Session: Not on file   Stress:     Feeling of Stress : Not on file   Social Connections:     Frequency of Communication with Friends and Family: Not on file    Frequency of Social Gatherings with Friends and Family: Not on file    Attends Mandaeism Services: Not on file    Active Member of Clubs or Organizations: Not on file    Attends Club or Organization Meetings: Not on file    Marital Status: Not on file   Intimate Partner Violence:     Fear of Current or Ex-Partner: Not on file    Emotionally Abused: Not on file    Physically Abused: Not on file    Sexually Abused: Not on file   Housing Stability:     Unable to Pay for Housing in the Last Year: Not on file    Number of [place] in the Last Year: Not on file    Unstable Housing in the Last Year: Not on file       Review of Systems   All other systems reviewed and are negative. Vitals:  Ht 6' (1.829 m)   Wt 220 lb (99.8 kg)   BMI 29.84 kg/m²    Body mass index is 29.84 kg/m². Ortho Exam     The patient is well-developed and well-nourished. The patient presents today in alert and oriented x3 with a normal mood and affect. The patient stands with a normal weightbearing line walks with a slightly antalgic gait because of his postoperative left knee pain. Left knee: Incision is nicely healed without surrounding erythema. Motion from 2-100 degrees. There is some instability noted secondary to his quadriceps weakness. No crepitus with passive range of motion of the knee. 5/5 strength with quadricep tendon testing. Sensation is intact to light touch distally in the leg and the foot. ASSESSMENT/PLAN:      1. Postoperative pain of left knee  2. Status post left knee surgery  -     REFERRAL TO PHYSICAL THERAPY  3. Contracture of left knee  4. Patella, chondromalacia, left      Below is the assessment and plan developed based on review of pertinent history, physical exam, labs, studies, and medications. **The patient will continue attending formal physical therapy. **    We discussed the patient's left open quadricep tendon repair on 10/20/2021. He has now approximately 10 weeks status post surgical intervention. The patient has been out of work up to this point. He has been working diligently with physical therapy will continue physical therapy to work on his range of motion and strengthening. We did discuss with him the importance of regaining his range of motion. He can start to wean out of the brace at this point and wear it only in a crowd. We will transition into an [facility] custom Ascender knee brace.   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 He will continue to work on range of motion, strengthening, and stretching exercises with both formal physical therapy and with an at-home exercise program as pain tolerates. He is still to avoid any deep knee bend activities against resistance, squatting, kneeling, stairs, lunging, and high impact loading activities. He may continue to increase activities as tolerated and as discussed today in the office. The patient was given documentation to return to work on 1/13/2022. He will return to full duty work at that time. He may have some difficulty with running or deep squatting when he returns to work. I will see him back in 4 to 6 weeks for reevaluation and further discussion of the postoperative protocol and his work status. Return in about 6 weeks (around 2/8/2022).

## 2021-12-28 NOTE — LETTER
12/28/2021 4:05 PM    Mr. Selina Samson  [address] [facility] [address]    Mr. Peter Soria is able to return to full duty work on 1/13/22.            Sincerely,      Lonnie Pires MD

## 2021-12-29 VITALS — BODY MASS INDEX: 29.8 KG/M2 | HEIGHT: 72 IN | WEIGHT: 220 LBS

## 2022-01-18 ENCOUNTER — TELEPHONE (OUTPATIENT)
Dept: ONCOLOGY | Age: 47
End: 2022-01-18

## 2022-01-18 NOTE — TELEPHONE ENCOUNTER
Called patient to schedule up coming appt due to check out note. Patient informed me that he is back at work and will call to schedule. Offered the scheduling number, patient stated he still has to check with his work to see what days he has off. I informed him that the number is different than our number. Patient told me multiple times he has to check with his work, then told me he had number on the referral still.  Please advise

## 2022-02-09 NOTE — PROGRESS NOTES
Tatum Tipton (: 1975) is a 55 y.o. male, patient, here for evaluation of the following chief complaint(s):  Surgical Follow-up and Knee Pain (left)       HPI:    He is now approximately 16 weeks status post left knee open quadriceps tendon repair. The surgery was performed on 10/21/2021. He was last seen on 2021. The patient states that his pain has improved since his last visit and surgical procedure. He  describes his postoperative left knee pain knee and leg pain  is dull, throbbing, aching, and intermittent. His postoperative left does not wake him up from sleep at night. He has been experiencing some postoperative weakness and swelling. The patient has been working on range of motion, strengthening, and stretching exercises at both formal physical therapy and with an at-home exercise program and reports that both the formal PT and home exercises have helped reduce his postoperative discomfort. Allergies   Allergen Reactions    Amoxapine Rash       Current Outpatient Medications   Medication Sig    ALPRAZolam (XANAX) 1 mg tablet     atenoloL (TENORMIN) 50 mg tablet TAKE 1 AND 1/2 TABLETS BY MOUTH EVERY DAY    clonazePAM (KlonoPIN) 2 mg tablet     traZODone (DESYREL) 100 mg tablet TAKE 1 AND 1/2 TABLETS BY MOUTH AT BEDTIME    pravastatin (PRAVACHOL) 20 mg tablet Take 20 mg by mouth daily. (Patient not taking: Reported on 2021)    diclofenac (Voltaren) 1 % gel Apply  to affected area. (Patient not taking: Reported on 2021)    oxyCODONE-acetaminophen (PERCOCET) 5-325 mg per tablet TAKE 1 TABLET BY MOUTH EVERY 4 HOURS TO 6 HOURS AS NEEDED FOR PAIN     No current facility-administered medications for this visit.        Past Medical History:   Diagnosis Date    Hypertension         Past Surgical History:   Procedure Laterality Date    HX CHOLECYSTECTOMY      HX HERNIA REPAIR      HX ORTHOPAEDIC Left 10/20/2021    knee surgery    HX SHOULDER ARTHROSCOPY Left  Family History   Problem Relation Age of Onset    Leukemia Mother     Heart Disease Father         Social History     Socioeconomic History    Marital status:      Spouse name: Not on file    Number of children: Not on file    Years of education: Not on file    Highest education level: Not on file   Occupational History    Not on file   Tobacco Use    Smoking status: Current Every Day Smoker     Packs/day: 0.50    Smokeless tobacco: Never Used   Substance and Sexual Activity    Alcohol use: Yes     Comment: rarely    Drug use: Yes     Types: Marijuana     Comment: once per month    Sexual activity: Not on file   Other Topics Concern    Not on file   Social History Narrative    Not on file     Social Determinants of Health     Financial Resource Strain:     Difficulty of Paying Living Expenses: Not on file   Food Insecurity:     Worried About Running Out of Food in the Last Year: Not on file    Darlin of Food in the Last Year: Not on file   Transportation Needs:     Lack of Transportation (Medical): Not on file    Lack of Transportation (Non-Medical):  Not on file   Physical Activity:     Days of Exercise per Week: Not on file    Minutes of Exercise per Session: Not on file   Stress:     Feeling of Stress : Not on file   Social Connections:     Frequency of Communication with Friends and Family: Not on file    Frequency of Social Gatherings with Friends and Family: Not on file    Attends Synagogue Services: Not on file    Active Member of Clubs or Organizations: Not on file    Attends Club or Organization Meetings: Not on file    Marital Status: Not on file   Intimate Partner Violence:     Fear of Current or Ex-Partner: Not on file    Emotionally Abused: Not on file    Physically Abused: Not on file    Sexually Abused: Not on file   Housing Stability:     Unable to Pay for Housing in the Last Year: Not on file    Number of [place] in the Last Year: Not on file    Unstable Housing in the Last Year: Not on file       Review of Systems   All other systems reviewed and are negative. Vitals: There were no vitals taken for this visit. There is no height or weight on file to calculate BMI. Ortho Exam     The patient is well-developed and well-nourished. The patient presents today in alert and oriented x3 with a normal mood and affect. The patient stands with a normal weightbearing line walks with a slightly antalgic gait because of his postoperative left knee pain.     Left knee: Incision is nicely healed without surrounding erythema. Motion from 0-120 degrees. There is still some instability noted secondary to his quadriceps weakness. No crepitus with passive range of motion of the knee. Quadricep strength continues to improve but there is approximately 1 inch of quadriceps atrophy. No significant postoperative effusion present. Sensation is intact to light touch distally in the leg and the foot. ASSESSMENT/PLAN:      1. Postoperative pain of left knee  2. Status post left knee surgery       Below is the assessment and plan developed based on review of pertinent history, physical exam, labs, studies, and medications. We discussed the patient's left knee open quadriceps tendon repair. The surgery was performed on 10/21/2021. He is now approximately 16 weeks status post surgical intervention. The patient continues to progress nicely in his recovery. His pain is intermittent and well controlled. His range of motion and strength both continue to improve. The patient will continue the postoperative protocol by working on range of motion, strengthening, and stretching exercises with an at-home exercise program as pain tolerates. He may continue to increase activities as tolerated and as discussed today in the office. He is still to avoid any deep knee bend activities against resistance, squatting, kneeling, lunging, and high impact loading activities. The patient was given documentation to go back to work but he is to do no squatting, kneeling, or running for at least 3 more months. I did encourage him to ice and elevate when possible, modify his activity level based on his postoperative left knee and leg pain, and use anti-inflammatory medication when necessary. I will see him back in 4 to 6 weeks for reevaluation and further discussion of the postoperative protocol and his work status/restrictions. Return in about 6 years (around 2/11/2028) for Re-evaluation and further discussion of the postop protocol. An electronic signature was used to authenticate this note.   -- Mansi Hatfield MD

## 2022-02-11 ENCOUNTER — OFFICE VISIT (OUTPATIENT)
Dept: ORTHOPEDIC SURGERY | Age: 47
End: 2022-02-11

## 2022-02-11 DIAGNOSIS — M25.562 POSTOPERATIVE PAIN OF LEFT KNEE: Primary | ICD-10-CM

## 2022-02-11 DIAGNOSIS — Z98.890 STATUS POST LEFT KNEE SURGERY: ICD-10-CM

## 2022-02-11 DIAGNOSIS — G89.18 POSTOPERATIVE PAIN OF LEFT KNEE: Primary | ICD-10-CM

## 2022-02-11 PROCEDURE — 99213 OFFICE O/P EST LOW 20 MIN: CPT | Performed by: ORTHOPAEDIC SURGERY

## 2022-02-11 NOTE — LETTER
2/11/2022 9:33 AM    Mr. Daisy Cotton  [address] [address]        May return to work  light duty beginning 02/11/22 with the following restrictions         No squatting , kneeling or bending climbing or running until seen again in three months     Sincerely,      Wilson iSms MD
No

## 2022-03-18 PROBLEM — D58.2 ELEVATED HEMOGLOBIN (HCC): Status: ACTIVE | Noted: 2021-11-12

## 2022-03-19 PROBLEM — F17.213 CIGARETTE NICOTINE DEPENDENCE WITH WITHDRAWAL: Status: ACTIVE | Noted: 2021-12-02

## 2022-05-02 ENCOUNTER — DOCUMENTATION ONLY (OUTPATIENT)
Dept: ORTHOPEDIC SURGERY | Age: 47
End: 2022-05-02

## 2022-05-13 ENCOUNTER — DOCUMENTATION ONLY (OUTPATIENT)
Dept: ORTHOPEDIC SURGERY | Age: 47
End: 2022-05-13

## 2022-05-13 ENCOUNTER — OFFICE VISIT (OUTPATIENT)
Dept: ORTHOPEDIC SURGERY | Age: 47
End: 2022-05-13

## 2022-05-13 VITALS — BODY MASS INDEX: 29.8 KG/M2 | HEIGHT: 72 IN | WEIGHT: 220 LBS

## 2022-05-13 DIAGNOSIS — M25.562 POSTOPERATIVE PAIN OF LEFT KNEE: Primary | ICD-10-CM

## 2022-05-13 DIAGNOSIS — G89.18 POSTOPERATIVE PAIN OF LEFT KNEE: Primary | ICD-10-CM

## 2022-05-13 DIAGNOSIS — Z98.890 STATUS POST LEFT KNEE SURGERY: ICD-10-CM

## 2022-05-13 PROCEDURE — 99213 OFFICE O/P EST LOW 20 MIN: CPT | Performed by: ORTHOPAEDIC SURGERY

## 2022-05-13 NOTE — PROGRESS NOTES
Dali Branham (: 1975) is a 55 y.o. male, patient, here for evaluation of the following chief complaint(s):  Knee Pain (left)       HPI:    He approaching 7 months status post left knee open quadriceps tendon repair. The surgery was performed on 10/21/2021. He was last seen on 2022. The patient states that his pain level has improved since his last visit and surgical procedure. He rates the severity of his postoperative left knee and leg pain is a 1 out of 10. He describes pain as dull and intermittent. His postoperative discomfort does not wake him up from sleep at night. He does experience some intermittent swelling. The patient has been working on range of motion, strengthening, and stretching exercises with an at-home exercise program as pain tolerates. Allergies   Allergen Reactions    Amoxapine Rash       Current Outpatient Medications   Medication Sig    ALPRAZolam (XANAX) 1 mg tablet     atenoloL (TENORMIN) 50 mg tablet TAKE 1 AND 1/2 TABLETS BY MOUTH EVERY DAY    clonazePAM (KlonoPIN) 2 mg tablet     traZODone (DESYREL) 100 mg tablet TAKE 1 AND 1/2 TABLETS BY MOUTH AT BEDTIME    pravastatin (PRAVACHOL) 20 mg tablet Take 20 mg by mouth daily. (Patient not taking: Reported on 2021)    diclofenac (Voltaren) 1 % gel Apply  to affected area. (Patient not taking: Reported on 2021)    oxyCODONE-acetaminophen (PERCOCET) 5-325 mg per tablet TAKE 1 TABLET BY MOUTH EVERY 4 HOURS TO 6 HOURS AS NEEDED FOR PAIN     No current facility-administered medications for this visit.        Past Medical History:   Diagnosis Date    Hypertension         Past Surgical History:   Procedure Laterality Date    HX CHOLECYSTECTOMY  2012    HX HERNIA REPAIR      HX ORTHOPAEDIC Left 10/20/2021    knee surgery    HX SHOULDER ARTHROSCOPY Left        Family History   Problem Relation Age of Onset    Leukemia Mother     Heart Disease Father         Social History     Socioeconomic History    Marital status:      Spouse name: Not on file    Number of children: Not on file    Years of education: Not on file    Highest education level: Not on file   Occupational History    Not on file   Tobacco Use    Smoking status: Current Every Day Smoker     Packs/day: 0.50    Smokeless tobacco: Never Used   Substance and Sexual Activity    Alcohol use: Yes     Comment: rarely    Drug use: Yes     Types: Marijuana     Comment: once per month    Sexual activity: Not on file   Other Topics Concern    Not on file   Social History Narrative    Not on file     Social Determinants of Health     Financial Resource Strain:     Difficulty of Paying Living Expenses: Not on file   Food Insecurity:     Worried About Running Out of Food in the Last Year: Not on file    Darlin of Food in the Last Year: Not on file   Transportation Needs:     Lack of Transportation (Medical): Not on file    Lack of Transportation (Non-Medical): Not on file   Physical Activity:     Days of Exercise per Week: Not on file    Minutes of Exercise per Session: Not on file   Stress:     Feeling of Stress : Not on file   Social Connections:     Frequency of Communication with Friends and Family: Not on file    Frequency of Social Gatherings with Friends and Family: Not on file    Attends Mosque Services: Not on file    Active Member of [address] [place] [address] or Organizations: Not on file    Attends Club or Organization Meetings: Not on file    Marital Status: Not on file   Intimate Partner Violence:     Fear of Current or Ex-Partner: Not on file    Emotionally Abused: Not on file    Physically Abused: Not on file    Sexually Abused: Not on file   Housing Stability:     Unable to Pay for Housing in the Last Year: Not on file    Number of [place] in the Last Year: Not on file    Unstable Housing in the Last Year: Not on file       Review of Systems   All other systems reviewed and are negative.       Vitals:  Ht 6' (1.829 m)   [employer] 220 lb (99.8 kg)   BMI 29.84 kg/m²    Body mass index is 29.84 kg/m². Ortho Exam     The patient is well-developed and well-nourished.  The patient presents today in alert and oriented x3 with a normal mood and affect.  The patient stands with a normal weightbearing line walks with a slightly antalgic gait because of his postoperative left knee pain.     Left knee wound is clean dry neurovascular intact. There is no ecchymosis or erythema. His incisions are well-healed with no sign of irritation or infection and look normal.  He does have full extension. He has essentially regained full range of motion. There is no instability noted secondary to his quadriceps weakness. There is no crepitus with passive range of motion of the knee. Quadricep strength continues to improve. There is still some slight atrophy noted compared to normal.  He can easily do a seated straight leg raise without discomfort. There is no postoperative effusion present. He reports no calf tenderness. His sensations are intact his pulses are 2+. His skin is well-healed. ASSESSMENT/PLAN:      1. Postoperative pain of left knee  2. Status post left knee surgery       Below is the assessment and plan developed based on review of pertinent history, physical exam, labs, studies, and medications. We discussed the patient's left knee open quadriceps tendon repair. The surgery was performed on 10/21/2021. He is now approaching 7 months status post status post surgical intervention. The patient continues to progress nicely in his recovery. His pain is intermittent and well controlled. He has regained full range of motion. His quadricep strength continues to improve. There is still some slight atrophy noted compared to normal..  The patient will continue the postoperative protocol by working on range of motion, strengthening, and stretching exercises with an at-home exercise program as pain tolerates.   He may continue to increase activities as tolerated and as discussed today in the office. He is still to avoid any deep knee bend activities against resistance, squatting, kneeling, lunging, and high impact loading activities. The patient will return to full duty work. I did encourage him to ice and elevate when possible, modify his activity level based on his postoperative left knee and leg pain, and use anti-inflammatory medication when necessary. I will see him back on an as-needed basis. Return if symptoms worsen or fail to improve. An electronic signature was used to authenticate this note.   -- Cristina Chaparro MD

## 2022-06-09 ENCOUNTER — DOCUMENTATION ONLY (OUTPATIENT)
Dept: ORTHOPEDIC SURGERY | Age: 47
End: 2022-06-09

## 2022-12-07 ENCOUNTER — TELEPHONE (OUTPATIENT)
Dept: ONCOLOGY | Age: 47
End: 2022-12-07

## 2022-12-12 NOTE — TELEPHONE ENCOUNTER
[address] at [facility]  [phone]    12/12/22 8:42 AM - Attempted to call the patient to see if he is interested in scheduling a follow-up appointment. The call went straight to voicemail. Left a voicemail for the patient to call back at their earliest convenience along with the phone number to our office.

## 2023-11-10 ENCOUNTER — APPOINTMENT (OUTPATIENT)
Dept: VASCULAR SURGERY | Facility: HOSPITAL | Age: 48
End: 2023-11-10
Attending: EMERGENCY MEDICINE
Payer: COMMERCIAL

## 2023-11-10 ENCOUNTER — APPOINTMENT (OUTPATIENT)
Facility: HOSPITAL | Age: 48
End: 2023-11-10
Payer: COMMERCIAL

## 2023-11-10 ENCOUNTER — HOSPITAL ENCOUNTER (EMERGENCY)
Facility: HOSPITAL | Age: 48
Discharge: HOME | End: 2023-11-10
Attending: EMERGENCY MEDICINE
Payer: COMMERCIAL

## 2023-11-10 VITALS
SYSTOLIC BLOOD PRESSURE: 154 MMHG | WEIGHT: 205 LBS | HEIGHT: 72 IN | TEMPERATURE: 97.8 F | DIASTOLIC BLOOD PRESSURE: 99 MMHG | OXYGEN SATURATION: 98 % | HEART RATE: 81 BPM | RESPIRATION RATE: 18 BRPM | BODY MASS INDEX: 27.77 KG/M2

## 2023-11-10 DIAGNOSIS — M54.17 LUMBOSACRAL RADICULOPATHY: ICD-10-CM

## 2023-11-10 DIAGNOSIS — M54.42 ACUTE BILATERAL LOW BACK PAIN WITH BILATERAL SCIATICA: Primary | ICD-10-CM

## 2023-11-10 DIAGNOSIS — M54.41 ACUTE BILATERAL LOW BACK PAIN WITH BILATERAL SCIATICA: Primary | ICD-10-CM

## 2023-11-10 PROCEDURE — 93971 EXTREMITY STUDY: CPT

## 2023-11-10 PROCEDURE — 96372 THER/PROPH/DIAG INJ SC/IM: CPT

## 2023-11-10 PROCEDURE — 6360000002 HC RX W HCPCS: Performed by: EMERGENCY MEDICINE

## 2023-11-10 PROCEDURE — 72131 CT LUMBAR SPINE W/O DYE: CPT

## 2023-11-10 PROCEDURE — 99284 EMERGENCY DEPT VISIT MOD MDM: CPT

## 2023-11-10 RX ORDER — NAPROXEN 500 MG/1
500 TABLET ORAL 2 TIMES DAILY PRN
Qty: 10 TABLET | Refills: 0 | Status: SHIPPED | OUTPATIENT
Start: 2023-11-10 | End: 2023-11-10 | Stop reason: SDUPTHER

## 2023-11-10 RX ORDER — BUPIVACAINE HYDROCHLORIDE 5 MG/ML
20 INJECTION, SOLUTION EPIDURAL; INTRACAUDAL
Status: COMPLETED | OUTPATIENT
Start: 2023-11-10 | End: 2023-11-10

## 2023-11-10 RX ORDER — ACETAMINOPHEN 500 MG
1000 TABLET ORAL EVERY 6 HOURS PRN
Qty: 40 TABLET | Refills: 0 | Status: SHIPPED | OUTPATIENT
Start: 2023-11-10 | End: 2023-11-10 | Stop reason: SDUPTHER

## 2023-11-10 RX ORDER — METHYLPREDNISOLONE 4 MG/1
TABLET ORAL
Qty: 1 KIT | Refills: 0 | Status: SHIPPED | OUTPATIENT
Start: 2023-11-10 | End: 2023-11-10 | Stop reason: SDUPTHER

## 2023-11-10 RX ORDER — NAPROXEN 500 MG/1
500 TABLET ORAL 2 TIMES DAILY PRN
Qty: 10 TABLET | Refills: 0 | Status: SHIPPED | OUTPATIENT
Start: 2023-11-10 | End: 2023-11-15

## 2023-11-10 RX ORDER — ACETAMINOPHEN 500 MG
1000 TABLET ORAL EVERY 6 HOURS PRN
Qty: 40 TABLET | Refills: 0 | Status: SHIPPED | OUTPATIENT
Start: 2023-11-10

## 2023-11-10 RX ORDER — METHYLPREDNISOLONE 4 MG/1
TABLET ORAL
Qty: 1 KIT | Refills: 0 | Status: SHIPPED | OUTPATIENT
Start: 2023-11-10

## 2023-11-10 RX ORDER — TRIAMCINOLONE ACETONIDE 40 MG/ML
40 INJECTION, SUSPENSION INTRA-ARTICULAR; INTRAMUSCULAR ONCE
Status: COMPLETED | OUTPATIENT
Start: 2023-11-10 | End: 2023-11-10

## 2023-11-10 RX ADMIN — TRIAMCINOLONE ACETONIDE 40 MG: 40 INJECTION, SUSPENSION INTRA-ARTICULAR; INTRAMUSCULAR at 18:50

## 2023-11-10 RX ADMIN — BUPIVACAINE HYDROCHLORIDE 100 MG: 5 INJECTION, SOLUTION EPIDURAL; INTRACAUDAL; PERINEURAL at 18:50

## 2023-11-10 ASSESSMENT — PAIN - FUNCTIONAL ASSESSMENT: PAIN_FUNCTIONAL_ASSESSMENT: 0-10

## 2023-11-10 ASSESSMENT — PAIN SCALES - GENERAL: PAINLEVEL_OUTOF10: 3

## 2023-11-10 ASSESSMENT — PAIN DESCRIPTION - ORIENTATION: ORIENTATION: RIGHT

## 2023-11-10 ASSESSMENT — PAIN DESCRIPTION - LOCATION: LOCATION: LEG

## 2023-11-10 ASSESSMENT — PAIN DESCRIPTION - DESCRIPTORS: DESCRIPTORS: BURNING;THROBBING

## 2023-11-10 NOTE — ED TRIAGE NOTES
Patient arrives to ed via pov with c/o lower back pain and right calf pain x 1 year, pt sts the pain started as the lower back that radiating into both legs and now the left calf is causing more pain than before. Patient has hx of blood clot in right leg few years ago. Pt denies any further symptoms.

## 2023-11-11 LAB — ECHO BSA: 2.17 M2

## 2023-11-11 NOTE — ED PROVIDER NOTES
[facility] EMERGENCY DEPT  EMERGENCY DEPARTMENT ENCOUNTER      Pt Name: Shanthi Downs  MRN: [id number]  [address] [facility] 1975  Date of evaluation: 11/10/2023  Provider: Kathlee Schaumann, MD    CHIEF COMPLAINT       Chief Complaint   Patient presents with    Leg Pain    Back Pain         HISTORY OF PRESENT ILLNESS    27-year-old male presents with chronic low back pain that is progressed with tingling of the outside of his right thigh and pain in both calves worse on the right side. He has a history of an unprovoked DVT previously and had completed anticoagulation therapy and is concerned he could have a recurrent blood clot. No swelling of the leg. Pain is worse when he is up and moving. Review of External Medical Records:     Nursing Notes were reviewed. REVIEW OF SYSTEMS       Review of Systems    Except as noted above the remainder of the review of systems was reviewed and negative.        PAST MEDICAL HISTORY     Past Medical History:   Diagnosis Date    Hypertension          SURGICAL HISTORY       Past Surgical History:   Procedure Laterality Date    CHOLECYSTECTOMY  2012    HERNIA REPAIR      ORTHOPEDIC SURGERY Left 10/20/2021    knee surgery    SHOULDER ARTHROSCOPY Left 2005         CURRENT MEDICATIONS       Previous Medications    ALPRAZOLAM (XANAX) 1 MG TABLET    ceived the following from [facility]: Outside name: ALPRAZolam (XANAX) 1 mg tablet    ATENOLOL (TENORMIN) 50 MG TABLET    TAKE 1 AND 1/2 TABLETS BY MOUTH EVERY DAY    CLONAZEPAM (KLONOPIN) 2 MG TABLET    ceived the following from [facility]: Outside name: clonazePAM (KlonoPIN) 2 mg tablet    DICLOFENAC SODIUM (VOLTAREN) 1 % GEL    Apply topically    OXYCODONE-ACETAMINOPHEN (PERCOCET) 5-325 MG PER TABLET    TAKE 1 TABLET BY MOUTH EVERY 4 HOURS TO 6 HOURS AS NEEDED FOR PAIN    PRAVASTATIN (PRAVACHOL) 20 MG TABLET    Take 1 tablet by mouth daily    TRAZODONE (DESYREL) 100 MG TABLET    TAKE 1 AND 1/2 TABLETS BY

## 2023-11-11 NOTE — ED PROVIDER NOTES
8:54 PM  Change of shift. Care of patient taken over from Dr. Tracy Hutton; H&P reviewed, bedside handoff complete. Awaiting CT    CT reviewed. Nothing acute. Prescriptions have been resent to pharmacy that is open.   Patient discharged home in stable condition       Ksenia Sauceda DO  11/10/23 [id number]

## 2023-12-09 ENCOUNTER — HOSPITAL ENCOUNTER (OUTPATIENT)
Facility: HOSPITAL | Age: 48
End: 2023-12-09
Attending: NEUROLOGICAL SURGERY
Payer: COMMERCIAL

## 2023-12-09 DIAGNOSIS — M48.062 LUMBAR STENOSIS WITH NEUROGENIC CLAUDICATION: ICD-10-CM

## 2023-12-09 PROCEDURE — 72148 MRI LUMBAR SPINE W/O DYE: CPT

## 2023-12-26 ENCOUNTER — HOSPITAL ENCOUNTER (EMERGENCY)
Facility: HOSPITAL | Age: 48
Discharge: HOME | End: 2023-12-26
Attending: EMERGENCY MEDICINE
Payer: COMMERCIAL

## 2023-12-26 VITALS
OXYGEN SATURATION: 98 % | RESPIRATION RATE: 18 BRPM | WEIGHT: 200 LBS | HEART RATE: 82 BPM | TEMPERATURE: 98.4 F | SYSTOLIC BLOOD PRESSURE: 160 MMHG | HEIGHT: 72 IN | BODY MASS INDEX: 27.09 KG/M2 | DIASTOLIC BLOOD PRESSURE: 99 MMHG

## 2023-12-26 DIAGNOSIS — M79.671 ACUTE FOOT PAIN, RIGHT: Primary | ICD-10-CM

## 2023-12-26 PROCEDURE — 99283 EMERGENCY DEPT VISIT LOW MDM: CPT

## 2023-12-26 RX ORDER — PREDNISONE 50 MG/1
50 TABLET ORAL DAILY
Qty: 5 TABLET | Refills: 0 | Status: SHIPPED | OUTPATIENT
Start: 2023-12-26 | End: 2023-12-31

## 2023-12-26 NOTE — ED TRIAGE NOTES
Reports this is his 3rd visit  for foot pain.   Has boot, has had ultra sound and has had x-rays and has had an MRI and seen a neurologist.

## 2023-12-26 NOTE — ED PROVIDER NOTES
[facility] EMERGENCY DEPT  EMERGENCY DEPARTMENT ENCOUNTER      Pt Name: Jose Parrish  MRN: [id number]  [address] 1975  Date of evaluation: 12/26/2023  Provider: Georgia Dee MD      HISTORY OF PRESENT ILLNESS      55-year-old male with history of hypertension presents to the emergency department chief complaint of foot pain. He has been seen several times for foot pain as well as back pain recently with unclear diagnosis. He likely has some element of sciatica but this pain is different, describes a sharp pain at the bottom of his foot without specific injury. Recent x-rays without acute findings. The history is provided by the patient and medical records. Nursing Notes were reviewed. REVIEW OF SYSTEMS         Review of Systems        PAST MEDICAL HISTORY     Past Medical History:   Diagnosis Date    Hypertension          SURGICAL HISTORY       Past Surgical History:   Procedure Laterality Date    CHOLECYSTECTOMY  2012    HERNIA REPAIR      ORTHOPEDIC SURGERY Left 10/20/2021    knee surgery    SHOULDER ARTHROSCOPY Left 2005         CURRENT MEDICATIONS       Previous Medications    ACETAMINOPHEN (TYLENOL) 500 MG TABLET    Take 2 tablets by mouth every 6 hours as needed for Pain or Fever    ALPRAZOLAM (XANAX) 1 MG TABLET    ceived the following from [facility]: Outside name: ALPRAZolam (XANAX) 1 mg tablet    ATENOLOL (TENORMIN) 50 MG TABLET    TAKE 1 AND 1/2 TABLETS BY MOUTH EVERY DAY    CLONAZEPAM (KLONOPIN) 2 MG TABLET    ceived the following from [facility]: Outside name: clonazePAM (KlonoPIN) 2 mg tablet    DICLOFENAC SODIUM (VOLTAREN) 1 % GEL    Apply topically    METHYLPREDNISOLONE (MEDROL, MANSOOR,) 4 MG TABLET    Take by mouth.     NAPROXEN (NAPROSYN) 500 MG TABLET    Take 1 tablet by mouth 2 times daily as needed for Pain    OXYCODONE-ACETAMINOPHEN (PERCOCET) 5-325 MG PER TABLET    TAKE 1 TABLET BY MOUTH EVERY 4 HOURS TO 6 HOURS AS NEEDED FOR PAIN